# Patient Record
Sex: FEMALE | Race: WHITE | HISPANIC OR LATINO | URBAN - METROPOLITAN AREA
[De-identification: names, ages, dates, MRNs, and addresses within clinical notes are randomized per-mention and may not be internally consistent; named-entity substitution may affect disease eponyms.]

---

## 2022-09-01 ENCOUNTER — INPATIENT (INPATIENT)
Facility: HOSPITAL | Age: 32
LOS: 0 days | Discharge: HOME | End: 2022-09-02
Attending: PSYCHIATRY & NEUROLOGY | Admitting: PSYCHIATRY & NEUROLOGY

## 2022-09-01 VITALS
HEART RATE: 89 BPM | RESPIRATION RATE: 18 BRPM | SYSTOLIC BLOOD PRESSURE: 112 MMHG | DIASTOLIC BLOOD PRESSURE: 61 MMHG | OXYGEN SATURATION: 100 % | TEMPERATURE: 99 F

## 2022-09-01 DIAGNOSIS — F43.10 POST-TRAUMATIC STRESS DISORDER, UNSPECIFIED: ICD-10-CM

## 2022-09-01 DIAGNOSIS — F41.1 GENERALIZED ANXIETY DISORDER: ICD-10-CM

## 2022-09-01 DIAGNOSIS — F32.2 MAJOR DEPRESSIVE DISORDER, SINGLE EPISODE, SEVERE WITHOUT PSYCHOTIC FEATURES: ICD-10-CM

## 2022-09-01 LAB
ALBUMIN SERPL ELPH-MCNC: 4.4 G/DL — SIGNIFICANT CHANGE UP (ref 3.5–5.2)
ALP SERPL-CCNC: 90 U/L — SIGNIFICANT CHANGE UP (ref 30–115)
ALT FLD-CCNC: 9 U/L — SIGNIFICANT CHANGE UP (ref 0–41)
AMPHET UR-MCNC: NEGATIVE — SIGNIFICANT CHANGE UP
ANION GAP SERPL CALC-SCNC: 9 MMOL/L — SIGNIFICANT CHANGE UP (ref 7–14)
APAP SERPL-MCNC: <5 UG/ML — LOW (ref 10–30)
APPEARANCE UR: CLEAR — SIGNIFICANT CHANGE UP
APPEARANCE UR: CLEAR — SIGNIFICANT CHANGE UP
AST SERPL-CCNC: 16 U/L — SIGNIFICANT CHANGE UP (ref 0–41)
BACTERIA # UR AUTO: ABNORMAL
BARBITURATES UR SCN-MCNC: NEGATIVE — SIGNIFICANT CHANGE UP
BASE EXCESS BLDV CALC-SCNC: 0.8 MMOL/L — SIGNIFICANT CHANGE UP (ref -2–3)
BASOPHILS # BLD AUTO: 0.02 K/UL — SIGNIFICANT CHANGE UP (ref 0–0.2)
BASOPHILS NFR BLD AUTO: 0.4 % — SIGNIFICANT CHANGE UP (ref 0–1)
BENZODIAZ UR-MCNC: POSITIVE
BILIRUB SERPL-MCNC: 0.3 MG/DL — SIGNIFICANT CHANGE UP (ref 0.2–1.2)
BILIRUB UR-MCNC: NEGATIVE — SIGNIFICANT CHANGE UP
BILIRUB UR-MCNC: NEGATIVE — SIGNIFICANT CHANGE UP
BUN SERPL-MCNC: 14 MG/DL — SIGNIFICANT CHANGE UP (ref 10–20)
CA-I SERPL-SCNC: 1.28 MMOL/L — SIGNIFICANT CHANGE UP (ref 1.15–1.33)
CALCIUM SERPL-MCNC: 9.1 MG/DL — SIGNIFICANT CHANGE UP (ref 8.5–10.1)
CHLORIDE SERPL-SCNC: 106 MMOL/L — SIGNIFICANT CHANGE UP (ref 98–110)
CO2 SERPL-SCNC: 24 MMOL/L — SIGNIFICANT CHANGE UP (ref 17–32)
COCAINE METAB.OTHER UR-MCNC: POSITIVE
COLOR SPEC: YELLOW — SIGNIFICANT CHANGE UP
COLOR SPEC: YELLOW — SIGNIFICANT CHANGE UP
CREAT SERPL-MCNC: 0.9 MG/DL — SIGNIFICANT CHANGE UP (ref 0.7–1.5)
DIFF PNL FLD: NEGATIVE — SIGNIFICANT CHANGE UP
DIFF PNL FLD: NEGATIVE — SIGNIFICANT CHANGE UP
EGFR: 88 ML/MIN/1.73M2 — SIGNIFICANT CHANGE UP
EOSINOPHIL # BLD AUTO: 0.17 K/UL — SIGNIFICANT CHANGE UP (ref 0–0.7)
EOSINOPHIL NFR BLD AUTO: 3.7 % — SIGNIFICANT CHANGE UP (ref 0–8)
EPI CELLS # UR: 10 /HPF — HIGH (ref 0–5)
ETHANOL SERPL-MCNC: <10 MG/DL — SIGNIFICANT CHANGE UP
GAS PNL BLDV: 138 MMOL/L — SIGNIFICANT CHANGE UP (ref 136–145)
GAS PNL BLDV: SIGNIFICANT CHANGE UP
GLUCOSE SERPL-MCNC: 86 MG/DL — SIGNIFICANT CHANGE UP (ref 70–99)
GLUCOSE UR QL: NEGATIVE MG/DL — SIGNIFICANT CHANGE UP
GLUCOSE UR QL: NEGATIVE — SIGNIFICANT CHANGE UP
HCG SERPL QL: NEGATIVE — SIGNIFICANT CHANGE UP
HCG UR QL: NEGATIVE — SIGNIFICANT CHANGE UP
HCO3 BLDV-SCNC: 27 MMOL/L — SIGNIFICANT CHANGE UP (ref 22–29)
HCT VFR BLD CALC: 38.7 % — SIGNIFICANT CHANGE UP (ref 37–47)
HCT VFR BLDA CALC: 42 % — SIGNIFICANT CHANGE UP (ref 39–51)
HGB BLD CALC-MCNC: 14.1 G/DL — SIGNIFICANT CHANGE UP (ref 12.6–17.4)
HGB BLD-MCNC: 13.4 G/DL — SIGNIFICANT CHANGE UP (ref 12–16)
HYALINE CASTS # UR AUTO: 11 /LPF — HIGH (ref 0–7)
IMM GRANULOCYTES NFR BLD AUTO: 0.2 % — SIGNIFICANT CHANGE UP (ref 0.1–0.3)
KETONES UR-MCNC: NEGATIVE — SIGNIFICANT CHANGE UP
KETONES UR-MCNC: SIGNIFICANT CHANGE UP
LACTATE BLDV-MCNC: 0.7 MMOL/L — SIGNIFICANT CHANGE UP (ref 0.5–2)
LEUKOCYTE ESTERASE UR-ACNC: ABNORMAL
LEUKOCYTE ESTERASE UR-ACNC: NEGATIVE — SIGNIFICANT CHANGE UP
LYMPHOCYTES # BLD AUTO: 2.19 K/UL — SIGNIFICANT CHANGE UP (ref 1.2–3.4)
LYMPHOCYTES # BLD AUTO: 47.8 % — SIGNIFICANT CHANGE UP (ref 20.5–51.1)
MCHC RBC-ENTMCNC: 29.5 PG — SIGNIFICANT CHANGE UP (ref 27–31)
MCHC RBC-ENTMCNC: 34.6 G/DL — SIGNIFICANT CHANGE UP (ref 32–37)
MCV RBC AUTO: 85.2 FL — SIGNIFICANT CHANGE UP (ref 81–99)
METHADONE UR-MCNC: NEGATIVE — SIGNIFICANT CHANGE UP
MONOCYTES # BLD AUTO: 0.34 K/UL — SIGNIFICANT CHANGE UP (ref 0.1–0.6)
MONOCYTES NFR BLD AUTO: 7.4 % — SIGNIFICANT CHANGE UP (ref 1.7–9.3)
NEUTROPHILS # BLD AUTO: 1.85 K/UL — SIGNIFICANT CHANGE UP (ref 1.4–6.5)
NEUTROPHILS NFR BLD AUTO: 40.5 % — LOW (ref 42.2–75.2)
NITRITE UR-MCNC: NEGATIVE — SIGNIFICANT CHANGE UP
NITRITE UR-MCNC: NEGATIVE — SIGNIFICANT CHANGE UP
NRBC # BLD: 0 /100 WBCS — SIGNIFICANT CHANGE UP (ref 0–0)
OPIATES UR-MCNC: NEGATIVE — SIGNIFICANT CHANGE UP
PCO2 BLDV: 46 MMHG — HIGH (ref 39–42)
PCP SPEC-MCNC: SIGNIFICANT CHANGE UP
PH BLDV: 7.37 — SIGNIFICANT CHANGE UP (ref 7.32–7.43)
PH UR: 6 — SIGNIFICANT CHANGE UP (ref 5–8)
PH UR: 7.5 — SIGNIFICANT CHANGE UP (ref 5–8)
PLATELET # BLD AUTO: 270 K/UL — SIGNIFICANT CHANGE UP (ref 130–400)
PO2 BLDV: 37 MMHG — SIGNIFICANT CHANGE UP
POTASSIUM BLDV-SCNC: 3.8 MMOL/L — SIGNIFICANT CHANGE UP (ref 3.5–5.1)
POTASSIUM SERPL-MCNC: 4.2 MMOL/L — SIGNIFICANT CHANGE UP (ref 3.5–5)
POTASSIUM SERPL-SCNC: 4.2 MMOL/L — SIGNIFICANT CHANGE UP (ref 3.5–5)
PROPOXYPHENE QUALITATIVE URINE RESULT: NEGATIVE — SIGNIFICANT CHANGE UP
PROT SERPL-MCNC: 6.7 G/DL — SIGNIFICANT CHANGE UP (ref 6–8)
PROT UR-MCNC: NEGATIVE MG/DL — SIGNIFICANT CHANGE UP
PROT UR-MCNC: SIGNIFICANT CHANGE UP
RBC # BLD: 4.54 M/UL — SIGNIFICANT CHANGE UP (ref 4.2–5.4)
RBC # FLD: 12.9 % — SIGNIFICANT CHANGE UP (ref 11.5–14.5)
RBC CASTS # UR COMP ASSIST: 4 /HPF — SIGNIFICANT CHANGE UP (ref 0–4)
SALICYLATES SERPL-MCNC: <0.3 MG/DL — LOW (ref 4–30)
SAO2 % BLDV: 61.6 % — SIGNIFICANT CHANGE UP
SARS-COV-2 RNA SPEC QL NAA+PROBE: SIGNIFICANT CHANGE UP
SODIUM SERPL-SCNC: 139 MMOL/L — SIGNIFICANT CHANGE UP (ref 135–146)
SP GR SPEC: 1.02 — SIGNIFICANT CHANGE UP (ref 1.01–1.03)
SP GR SPEC: 1.03 — SIGNIFICANT CHANGE UP (ref 1.01–1.03)
TROPONIN T SERPL-MCNC: <0.01 NG/ML — SIGNIFICANT CHANGE UP
UROBILINOGEN FLD QL: 0.2 MG/DL — SIGNIFICANT CHANGE UP
UROBILINOGEN FLD QL: SIGNIFICANT CHANGE UP
WBC # BLD: 4.58 K/UL — LOW (ref 4.8–10.8)
WBC # FLD AUTO: 4.58 K/UL — LOW (ref 4.8–10.8)
WBC UR QL: 5 /HPF — SIGNIFICANT CHANGE UP (ref 0–5)

## 2022-09-01 PROCEDURE — 90792 PSYCH DIAG EVAL W/MED SRVCS: CPT | Mod: 95

## 2022-09-01 PROCEDURE — 93010 ELECTROCARDIOGRAM REPORT: CPT

## 2022-09-01 PROCEDURE — 93010 ELECTROCARDIOGRAM REPORT: CPT | Mod: 77

## 2022-09-01 PROCEDURE — 99215 OFFICE O/P EST HI 40 MIN: CPT

## 2022-09-01 PROCEDURE — 99284 EMERGENCY DEPT VISIT MOD MDM: CPT

## 2022-09-01 PROCEDURE — 99232 SBSQ HOSP IP/OBS MODERATE 35: CPT

## 2022-09-01 RX ORDER — DIPHENHYDRAMINE HCL 50 MG
50 CAPSULE ORAL EVERY 6 HOURS
Refills: 0 | Status: DISCONTINUED | OUTPATIENT
Start: 2022-09-01 | End: 2022-09-02

## 2022-09-01 RX ORDER — SERTRALINE 25 MG/1
50 TABLET, FILM COATED ORAL DAILY
Refills: 0 | Status: DISCONTINUED | OUTPATIENT
Start: 2022-09-01 | End: 2022-09-02

## 2022-09-01 RX ORDER — SERTRALINE 25 MG/1
50 TABLET, FILM COATED ORAL DAILY
Refills: 0 | Status: DISCONTINUED | OUTPATIENT
Start: 2022-09-01 | End: 2022-09-01

## 2022-09-01 RX ORDER — HALOPERIDOL DECANOATE 100 MG/ML
5 INJECTION INTRAMUSCULAR EVERY 8 HOURS
Refills: 0 | Status: DISCONTINUED | OUTPATIENT
Start: 2022-09-01 | End: 2022-09-01

## 2022-09-01 RX ORDER — HALOPERIDOL DECANOATE 100 MG/ML
5 INJECTION INTRAMUSCULAR EVERY 6 HOURS
Refills: 0 | Status: DISCONTINUED | OUTPATIENT
Start: 2022-09-01 | End: 2022-09-02

## 2022-09-01 RX ORDER — HYDROXYZINE HCL 10 MG
50 TABLET ORAL EVERY 6 HOURS
Refills: 0 | Status: DISCONTINUED | OUTPATIENT
Start: 2022-09-01 | End: 2022-09-02

## 2022-09-01 RX ORDER — DIPHENHYDRAMINE HCL 50 MG
50 CAPSULE ORAL EVERY 8 HOURS
Refills: 0 | Status: DISCONTINUED | OUTPATIENT
Start: 2022-09-01 | End: 2022-09-01

## 2022-09-01 RX ORDER — SODIUM CHLORIDE 9 MG/ML
1000 INJECTION, SOLUTION INTRAVENOUS ONCE
Refills: 0 | Status: DISCONTINUED | OUTPATIENT
Start: 2022-09-01 | End: 2022-09-01

## 2022-09-01 RX ADMIN — SERTRALINE 50 MILLIGRAM(S): 25 TABLET, FILM COATED ORAL at 15:34

## 2022-09-01 NOTE — ED ADULT NURSE NOTE - NSIMPLEMENTINTERV_GEN_ALL_ED
Implemented All Fall with Harm Risk Interventions:  [place] to call system. Call bell, personal items and telephone within reach. Instruct patient to call for assistance. Room bathroom lighting operational. Non-slip footwear when patient is off stretcher. Physically safe environment: no spills, clutter or unnecessary equipment. Stretcher in lowest position, wheels locked, appropriate side rails in place. Provide visual cue, wrist band, yellow gown, etc. Monitor gait and stability. Monitor for mental status changes and reorient to person, place, and time. Review medications for side effects contributing to fall risk. Reinforce activity limits and safety measures with patient and family. Provide visual clues: red socks.
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
## 2022-09-01 NOTE — ED BEHAVIORAL HEALTH ASSESSMENT NOTE - OTHER PAST PSYCHIATRIC HISTORY (INCLUDE DETAILS REGARDING ONSET, COURSE OF ILLNESS, INPATIENT/OUTPATIENT TREATMENT)
as per HPI  pt denied any formal psychiatric encounters beyond brief medical encounter subsequent to overdosing at 12/13 years old. Psyckes indicates history of MDD, unspecified anxiety and panic disorder with one ER visit for panic sx 10/2019.

## 2022-09-01 NOTE — ED ADULT TRIAGE NOTE - CHIEF COMPLAINT QUOTE
pts beena father states that pt took 8 blue xanax x1 hour ago, states it was an attempt to hurt herself, 1:1 initiated in triage

## 2022-09-01 NOTE — H&P ADULT - NSHPLABSRESULTS_GEN_ALL_CORE
13.4   4.58  )-----------( 270      ( 01 Sep 2022 02:09 )             38.7           139  |  106  |  14  ----------------------------<  86  4.2   |  24  |  0.9    Ca    9.1      01 Sep 2022 02:09    TPro  6.7  /  Alb  4.4  /  TBili  0.3  /  DBili  x   /  AST  16  /  ALT  9   /  AlkPhos  90          Urinalysis Basic - ( 01 Sep 2022 11:51 )    Color: Yellow / Appearance: Clear / S.029 / pH: x  Gluc: x / Ketone: Trace  / Bili: Negative / Urobili: <2 mg/dL   Blood: x / Protein: Trace / Nitrite: Negative   Leuk Esterase: Small / RBC: 4 /HPF / WBC 5 /HPF   Sq Epi: x / Non Sq Epi: 10 /HPF / Bacteria: Many    CARDIAC MARKERS ( 01 Sep 2022 02:09 )  x     / <0.01 ng/mL / x     / x     / x      Serum Pregnancy: Negative (22 @ 10:50)

## 2022-09-01 NOTE — BH CONSULTATION LIAISON PROGRESS NOTE - NSBHCHARTREVIEWINVESTIGATE_PSY_A_CORE FT
< from: 12 Lead ECG (09.01.22 @ 03:11) >      Ventricular Rate 80 BPM    Atrial Rate 80 BPM    P-R Interval 108 ms    QRS Duration 86 ms    Q-T Interval 364 ms    QTC Calculation(Bazett) 419 ms    P Axis 77 degrees    R Axis 87 degrees    T Axis 62 degrees    Diagnosis Line Sinus rhythm with short AL  Otherwise normal ECG    Confirmed by JOLENE BURGER MD ([id number]) on 9/1/2022 6:05:47 AM    < end of copied text >

## 2022-09-01 NOTE — BH CONSULTATION LIAISON PROGRESS NOTE - NSBHCHARTREVIEWLAB_PSY_A_CORE FT
13.4   4.58  )-----------( 270      ( 01 Sep 2022 02:09 )             38.7   09-01    139  |  106  |  14  ----------------------------<  86  4.2   |  24  |  0.9    Ca    9.1      01 Sep 2022 02:09    TPro  6.7  /  Alb  4.4  /  TBili  0.3  /  DBili  x   /  AST  16  /  ALT  9   /  AlkPhos  90  09-01

## 2022-09-01 NOTE — PROGRESS NOTE BEHAVIORAL HEALTH - NSBHADDHXSUBSTFT_PSY_A_CORE
Denies any ETOH illicit drug use besides the Xanax she started using occasionally within the last 2 weeks

## 2022-09-01 NOTE — H&P ADULT - NSHPPHYSICALEXAM_GEN_ALL_CORE
Vital Signs Last 24 Hrs  T(C): 37 (01 Sep 2022 12:38), Max: 37 (01 Sep 2022 01:41)  T(F): 98.6 (01 Sep 2022 12:38), Max: 98.6 (01 Sep 2022 01:41)  HR: 86 (01 Sep 2022 12:38) (76 - 89)  BP: 110/68 (01 Sep 2022 12:38) (92/54 - 112/61)  BP(mean): --  RR: 18 (01 Sep 2022 12:38) (16 - 18)  SpO2: 100% (01 Sep 2022 10:19) (100% - 100%)    Parameters below as of 01 Sep 2022 10:19  Patient On (Oxygen Delivery Method): room air      PHYSICAL EXAM:  Constitutional: NAD, A&O x3  Eyes: PERRLA, no conjunctivitis  Neck: no lymphadenopathy  Respiratory: +air entry, no rales, no rhonchi, no wheezes  Cardiovascular: +S1 and S2, regular rate and rhythm  Gastrointestinal: +BS, soft, non-tender, not distended  Extremities:  no edema, no calf tenderness  Skin: no rashes, normal turgor

## 2022-09-01 NOTE — CONSULT NOTE ADULT - CONSULT REQUESTED BY NAME
Pt ambulatory to triage without complications. Pt states cough since Thursday with white sputum. Pt denies fever, n/v/d. Pt reports bodyaches and intermittent chills. Pt states sometimes SOB when lying flat or on exertion, denies cp. Swelling to RLE. +3 Pitting. Pt reports hx of CHF but denies taking diuretic. Pt did get flu shot, not sure if around anyone else that has been ill. Attempted IV x 1. [place] collected.  Line not successful ED Attending
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
## 2022-09-01 NOTE — ED BEHAVIORAL HEALTH NOTE - BEHAVIORAL HEALTH NOTE
===================     PRE-HOSPITAL COURSE     ===================     SOURCE: RN and secondhand ED documentation      DETAILS: Per RN, pt self-presented to the ED accompanied by her partner.      ============     ED COURSE     ============     SOURCE: RN and secondhand ED documentation      ARRIVAL: Pt self-presented      BELONGINGS: Any/all belongings were provided to hospital security and pt is currently in a gown with a 1:1 staff member.       BEHAVIOR: Per RN, pt presented to the ED after taking 10 Xanax in attempt to harm self. Per RN, pt presents “fine”, complaint and is currently sleeping. Per RN, pt is slightly disconnected, denies active SI/HI and fair ADL’s/.     TREATMENT: None required      VISITORS: PT’s partner is at bedside         ------------------------------------------------     COVID Exposure Screen- collateral (i.e. third-party, chart review, belongings, etc; include EMS and ED staff)      ---------------------------------------------------     1.    Has the patient had a COVID-19 test in the last 90 days? Unknown.      2. Has the patient tested positive for COVID-19 antibodies? Unknown.      3.Has the patient received 2 doses of the COVID-19 vaccine?  Unknown.      4. In the past 10 days, has the patient been around anyone with a positive COVID-19 test?* Unknown.      5.Has the patient been out of [place] within the past 10 days? Unknown.

## 2022-09-01 NOTE — ED PROVIDER NOTE - PHYSICAL EXAMINATION
CONSTITUTIONAL:  in no acute distress.   SKIN: warm, dry  HEAD: Normocephalic; atraumatic.  EYES: PERRL, EOMI, normal sclera and conjunctiva   ENT: No nasal discharge; airway clear.  NECK: Supple; non tender.  CARD:  Regular rate and rhythm.   RESP: NO inc WOB   ABD: soft ntnd  EXT: Normal ROM.    LYMPH: No acute cervical adenopathy.  NEURO: Alert, oriented, grossly unremarkable  PSYCH: Cooperative, anxious
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
## 2022-09-01 NOTE — ED BEHAVIORAL HEALTH ASSESSMENT NOTE - RISK ASSESSMENT
Pertinent known risk and protective factors are noted in documentation above Moderate Acute Suicide Risk

## 2022-09-01 NOTE — H&P ADULT - HISTORY OF PRESENT ILLNESS
Patient is a 31 year old female with hx of anxiety, depression, panic disorder admitted to [facility] for suicide attempt by taking several tablets of Xanax. Patient is not a chronic benzo user. Patient denies tobacco/alcohol/illicit drug use. Patient has prior hx of suicide attempt when she was a teenager. Currently has no complaints.

## 2022-09-01 NOTE — PROGRESS NOTE BEHAVIORAL HEALTH - OTHER
grossly intact but somnolent in appearance expressive of depression/anxiety symptomatology and desire for treatment low cut hair dyed red/orange somnolent at onset of evaluation; slow to awaken with slow slurred speech throughout evaluation but generally well-mannered and interactive. fair with somnolent blinking unable to assess somnolent

## 2022-09-01 NOTE — BH CONSULTATION LIAISON PROGRESS NOTE - GENERAL APPEARANCE
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
## 2022-09-01 NOTE — CHART NOTE - NSCHARTNOTEFT_GEN_A_CORE
Social Work Note:    Patient is a 31 years of age female who was admitted for evaluation following a suicide attempt.  Patient was brought to the hospital by her boyfriend after she intentionally overdosed on 8-10 blue Xanax bars.  At the time of assessment in the emergency department, patient informed that she took the pills because she was “frustrated” due to "personal problems in my life that I'm going through".  She said she was “kind of" trying to kill herself because “sometimes I just don't want to live." She endorsed feeling depressed of late with symptoms such as  irritability, low energy, erratic sleep, poor concentration, and intermittent suicidal thoughts.  She was admitted to [facility] for safety and stabilization.      In the community, patient is domiciled in a private residence on [place] with her family.  She is domestically partnered and has a 10-year-old son.  Patient is self employed as a .  She has a past psychiatric history of major depressive disorder, unspecified anxiety, and panic disorder.  She has one known ED visit related to panic symptoms with subsequent trial of Hydroxyzine (10/2019).  She has no prior inpatient psychiatric admissions.  Patient reports one prior suicide attempt at age [age].  She is not currently engaged in outpatient mental health treatment.        Sexual History – Patient identifies as heterosexual.  She is in a domestic partnership.     Family relationships and history – Patient resides with her brother, her boyfriend, and her 10-year-old son.  Patient reports having a strained relationship with her boyfriend and limited social supports.      Leisure Activity Assessment – Unable to assess    Community Supports –  None known     Employment – Patient is self employed as a .       Substance Use Assessment – Patient denies    History of suicidality or self- injurious behaviors – Patient reports that she attempted suicide at age [age] by OD’ing on a family members antidepressants.       Significant Loses – None identified.         Life Goals – Patient verbalized goal of improved mental health         will continue to meet with patient 1:1 and with treatment team daily.  Discharge plan is for continued mental health treatment in an outpatient setting.
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
## 2022-09-01 NOTE — ED BEHAVIORAL HEALTH ASSESSMENT NOTE - OTHER
low cut hair dyed red/orange self-employed  somnolent [employer] ([address] [facility]) boyfriend/child's father grossly intact but somnolent in appearance unable to assess expressive of depression/anxiety symptomatology and desire for treatment Records Checked-With Data: psycemir. Records Checked-No Data: [facility], [facility], [facility], [facility], [facility], [facility], [facility], [facility], [facility], [facility], [facility], [facility], [facility], [facility], [facility], [facility], [facility], Social Media (For example - Facebook, Tactilize, YouFetch), Web search, [employer]. boyfriend somnolent at onset of evaluation; slow to awaken with slow slurred speech throughout evaluation but generally well-mannered and interactive. fair with somnolent blinking
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
## 2022-09-01 NOTE — ED PROVIDER NOTE - PROGRESS NOTE DETAILS
WILL < 10, telepsych contacted- SD William: Psych consulted, will come and see. Patient signed out to me by Dr. Massey.  Patient evaluated by psychiatry.  Patient to be admitted to [facility].

## 2022-09-01 NOTE — H&P ADULT - ASSESSMENT
Patient is a 31 year old female with hx of anxiety, depression, panic disorder admitted to [facility] for suicide attempt by taking several tablets of Xanax. Patient is not a chronic benzo user. Patient denies tobacco/alcohol/illicit drug use. Patient has prior hx of suicide attempt when she was a teenager. Currently has no complaints.    # suicide attempt  - There is very low concern for benzo withdrawal at this point. Monitor for benzo wd signs.   - Management per psychiatry  - EKG for qtc monitoring  - ambulate  - reg diet

## 2022-09-01 NOTE — BH CONSULTATION LIAISON PROGRESS NOTE - NSBHCHARTREVIEWVS_PSY_A_CORE FT
Vital Signs Last 24 Hrs  T(C): 36.1 (01 Sep 2022 10:19), Max: 37 (01 Sep 2022 01:41)  T(F): 97 (01 Sep 2022 10:19), Max: 98.6 (01 Sep 2022 01:41)  HR: 88 (01 Sep 2022 10:19) (76 - 89)  BP: 105/56 (01 Sep 2022 10:19) (92/54 - 112/61)  BP(mean): --  RR: 16 (01 Sep 2022 10:19) (16 - 18)  SpO2: 100% (01 Sep 2022 10:19) (100% - 100%)    Parameters below as of 01 Sep 2022 10:19  Patient On (Oxygen Delivery Method): room air

## 2022-09-01 NOTE — ED BEHAVIORAL HEALTH ASSESSMENT NOTE - PAST PSYCHOTROPIC MEDICATION
per psandrae; has previously been on Hydroxyzine 25 mg 3/day for ~2 weeks in 2019 corresponding with an ER visit

## 2022-09-01 NOTE — CONSULT NOTE ADULT - ASSESSMENT
·	Patient is in no acute Sedative/hypnotic toxidrome.   ·	Labs test results, incl. serum tox screen are within normal limits.  ·	No extended period of observation is necessary.  ·	She can be cleared from a toxicology standpoint.     Thank you for the consult.  ·	Patient is in no acute Sedative/hypnotic toxidrome.   ·	Labs test results, incl. serum tox screen are within normal limits.  ·	No extended period of observation is necessary.  ·	She can be cleared from a toxicology standpoint.     Thank you for the consult.     I personally discussed with ED team. I reviewed the med tox fellow’s note (as assigned above), and agree with the findings and plan except as documented in my note.    Alprazolam intentional ingestion without any evidence of edative hypnotic toxidrome.  Normal labs. vital signs and EKG.  Medically stable for psych eval and dispo and cleared from med tox perspective;    --Please call with any further questions    bOi    [phone] (pager)al and dispo and cleared from med tox standpoint.    .obi

## 2022-09-01 NOTE — ED BEHAVIORAL HEALTH ASSESSMENT NOTE - DIFFERENTIAL
major depressive disorder vs other mood disorder  generalized anxiety disorder w/ panic attacks  r/o adjustment disorder w/ mixed anxiety & depressed mood  r/o complex PTSD and borderline personality disorder

## 2022-09-01 NOTE — CONSULT NOTE ADULT - SUBJECTIVE AND OBJECTIVE BOX
MEDICAL TOXICOLOGY CONSULT    HPI: 31 Yr female PMHx Depression with intentional OD on 10 x Xanax 5 hours PTA. Patient is asymptomatic. Psyche requesting clearance by Toxicology      ONSET / TIME of exposure(s): 5 hours PTA    QUANTITY of exposure(s):10 x Xanax, exact mg/pill unknown    ROUTE of exposure:  Ingestion     CONTEXT of exposure: Home    ASSOCIATED symptoms: None    REVIEW OF SYSTEMS:    Negative except HPI above    Vital Signs Last 24 Hrs  T(C): 37 (01 Sep 2022 01:41), Max: 37 (01 Sep 2022 01:41)  T(F): 98.6 (01 Sep 2022 01:41), Max: 98.6 (01 Sep 2022 01:41)  HR: 89 (01 Sep 2022 01:41) (89 - 89)  BP: 112/61 (01 Sep 2022 01:41) (112/61 - 112/61)  RR: 18 (01 Sep 2022 01:41) (18 - 18)  SpO2: 100% (01 Sep 2022 01:41) (100% - 100%)    Parameters below as of 01 Sep 2022 01:41  Patient On (Oxygen Delivery Method): room air    SIGNIFICANT LABORATORY STUDIES:                        13.4   4.58  )-----------( 270      ( 01 Sep 2022 02:09 )             38.7     09-01    139  |  106  |  14  ----------------------------<  86  4.2   |  24  |  0.9    Ca    9.1      01 Sep 2022 02:09    TPro  6.7  /  Alb  4.4  /  TBili  0.3  /  DBili  x   /  AST  16  /  ALT  9   /  AlkPhos  90  09-01    Anion Gap: 9 09-01 @ 02:09  Osmolality Serum:  --  09-01 @ 02:09  Aspirin Level: <0.3<L>  09-01 @ 02:09  Acetaminophen Level:  <5.0<L>  09-01 @ 02:09      
No

## 2022-09-02 VITALS
RESPIRATION RATE: 16 BRPM | SYSTOLIC BLOOD PRESSURE: 110 MMHG | HEART RATE: 82 BPM | DIASTOLIC BLOOD PRESSURE: 63 MMHG | TEMPERATURE: 98 F

## 2022-09-02 DIAGNOSIS — T42.4X2A POISONING BY BENZODIAZEPINES, INTENTIONAL SELF-HARM, INITIAL ENCOUNTER: ICD-10-CM

## 2022-09-02 DIAGNOSIS — F34.1 DYSTHYMIC DISORDER: ICD-10-CM

## 2022-09-02 PROBLEM — F41.9 ANXIETY DISORDER, UNSPECIFIED: Chronic | Status: ACTIVE | Noted: 2022-09-01

## 2022-09-02 PROBLEM — F41.0 PANIC DISORDER [EPISODIC PAROXYSMAL ANXIETY]: Chronic | Status: ACTIVE | Noted: 2022-09-01

## 2022-09-02 PROBLEM — F32.9 MAJOR DEPRESSIVE DISORDER, SINGLE EPISODE, UNSPECIFIED: Chronic | Status: ACTIVE | Noted: 2022-09-01

## 2022-09-02 LAB
A1C WITH ESTIMATED AVERAGE GLUCOSE RESULT: 5.2 % — SIGNIFICANT CHANGE UP (ref 4–5.6)
ALBUMIN SERPL ELPH-MCNC: 4.5 G/DL — SIGNIFICANT CHANGE UP (ref 3.5–5.2)
ALP SERPL-CCNC: 86 U/L — SIGNIFICANT CHANGE UP (ref 30–115)
ALT FLD-CCNC: 8 U/L — SIGNIFICANT CHANGE UP (ref 0–41)
AMPHET UR-MCNC: SIGNIFICANT CHANGE UP
ANION GAP SERPL CALC-SCNC: 12 MMOL/L — SIGNIFICANT CHANGE UP (ref 7–14)
AST SERPL-CCNC: 16 U/L — SIGNIFICANT CHANGE UP (ref 0–41)
BARBITURATES UR SCN-MCNC: SIGNIFICANT CHANGE UP
BASOPHILS # BLD AUTO: 0.03 K/UL — SIGNIFICANT CHANGE UP (ref 0–0.2)
BASOPHILS NFR BLD AUTO: 0.7 % — SIGNIFICANT CHANGE UP (ref 0–1)
BENZODIAZ UR-MCNC: SIGNIFICANT CHANGE UP
BILIRUB SERPL-MCNC: 0.6 MG/DL — SIGNIFICANT CHANGE UP (ref 0.2–1.2)
BUN SERPL-MCNC: 14 MG/DL — SIGNIFICANT CHANGE UP (ref 10–20)
CALCIUM SERPL-MCNC: 9 MG/DL — SIGNIFICANT CHANGE UP (ref 8.5–10.1)
CHLORIDE SERPL-SCNC: 103 MMOL/L — SIGNIFICANT CHANGE UP (ref 98–110)
CHOLEST SERPL-MCNC: 200 MG/DL — HIGH
CO2 SERPL-SCNC: 23 MMOL/L — SIGNIFICANT CHANGE UP (ref 17–32)
COCAINE METAB.OTHER UR-MCNC: SIGNIFICANT CHANGE UP
CREAT SERPL-MCNC: 0.9 MG/DL — SIGNIFICANT CHANGE UP (ref 0.7–1.5)
DRUG SCREEN 1, URINE RESULT: SIGNIFICANT CHANGE UP
EGFR: 88 ML/MIN/1.73M2 — SIGNIFICANT CHANGE UP
EOSINOPHIL # BLD AUTO: 0.11 K/UL — SIGNIFICANT CHANGE UP (ref 0–0.7)
EOSINOPHIL NFR BLD AUTO: 2.6 % — SIGNIFICANT CHANGE UP (ref 0–8)
ESTIMATED AVERAGE GLUCOSE: 103 MG/DL — SIGNIFICANT CHANGE UP (ref 68–114)
FENTANYL UR QL: SIGNIFICANT CHANGE UP
GLUCOSE SERPL-MCNC: 98 MG/DL — SIGNIFICANT CHANGE UP (ref 70–99)
HCT VFR BLD CALC: 41.4 % — SIGNIFICANT CHANGE UP (ref 37–47)
HDLC SERPL-MCNC: 52 MG/DL — SIGNIFICANT CHANGE UP
HGB BLD-MCNC: 13.9 G/DL — SIGNIFICANT CHANGE UP (ref 12–16)
IMM GRANULOCYTES NFR BLD AUTO: 0.5 % — HIGH (ref 0.1–0.3)
LIPID PNL WITH DIRECT LDL SERPL: 134 MG/DL — HIGH
LYMPHOCYTES # BLD AUTO: 2.01 K/UL — SIGNIFICANT CHANGE UP (ref 1.2–3.4)
LYMPHOCYTES # BLD AUTO: 47.6 % — SIGNIFICANT CHANGE UP (ref 20.5–51.1)
MCHC RBC-ENTMCNC: 28.6 PG — SIGNIFICANT CHANGE UP (ref 27–31)
MCHC RBC-ENTMCNC: 33.6 G/DL — SIGNIFICANT CHANGE UP (ref 32–37)
MCV RBC AUTO: 85.2 FL — SIGNIFICANT CHANGE UP (ref 81–99)
METHADONE UR-MCNC: SIGNIFICANT CHANGE UP
MONOCYTES # BLD AUTO: 0.27 K/UL — SIGNIFICANT CHANGE UP (ref 0.1–0.6)
MONOCYTES NFR BLD AUTO: 6.4 % — SIGNIFICANT CHANGE UP (ref 1.7–9.3)
NEUTROPHILS # BLD AUTO: 1.78 K/UL — SIGNIFICANT CHANGE UP (ref 1.4–6.5)
NEUTROPHILS NFR BLD AUTO: 42.2 % — SIGNIFICANT CHANGE UP (ref 42.2–75.2)
NON HDL CHOLESTEROL: 148 MG/DL — HIGH
NRBC # BLD: 0 /100 WBCS — SIGNIFICANT CHANGE UP (ref 0–0)
OPIATES UR-MCNC: SIGNIFICANT CHANGE UP
OXYCODONE UR-MCNC: SIGNIFICANT CHANGE UP
PCP UR-MCNC: SIGNIFICANT CHANGE UP
PLATELET # BLD AUTO: 303 K/UL — SIGNIFICANT CHANGE UP (ref 130–400)
POTASSIUM SERPL-MCNC: 4.2 MMOL/L — SIGNIFICANT CHANGE UP (ref 3.5–5)
POTASSIUM SERPL-SCNC: 4.2 MMOL/L — SIGNIFICANT CHANGE UP (ref 3.5–5)
PROPOXYPHENE QUALITATIVE URINE RESULT: SIGNIFICANT CHANGE UP
PROT SERPL-MCNC: 7 G/DL — SIGNIFICANT CHANGE UP (ref 6–8)
RBC # BLD: 4.86 M/UL — SIGNIFICANT CHANGE UP (ref 4.2–5.4)
RBC # FLD: 12.5 % — SIGNIFICANT CHANGE UP (ref 11.5–14.5)
SODIUM SERPL-SCNC: 138 MMOL/L — SIGNIFICANT CHANGE UP (ref 135–146)
THC UR QL: SIGNIFICANT CHANGE UP
TRIGL SERPL-MCNC: 72 MG/DL — SIGNIFICANT CHANGE UP
TSH SERPL-MCNC: 0.44 UIU/ML — SIGNIFICANT CHANGE UP (ref 0.27–4.2)
WBC # BLD: 4.22 K/UL — LOW (ref 4.8–10.8)
WBC # FLD AUTO: 4.22 K/UL — LOW (ref 4.8–10.8)

## 2022-09-02 PROCEDURE — 99238 HOSP IP/OBS DSCHRG MGMT 30/<: CPT

## 2022-09-02 PROCEDURE — ZZZZZ: CPT

## 2022-09-02 RX ORDER — SERTRALINE 25 MG/1
1 TABLET, FILM COATED ORAL
Qty: 25 | Refills: 0
Start: 2022-09-02 | End: 2022-09-26

## 2022-09-02 RX ADMIN — Medication 1 TABLET(S): at 08:23

## 2022-09-02 RX ADMIN — SERTRALINE 50 MILLIGRAM(S): 25 TABLET, FILM COATED ORAL at 08:23
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
## 2022-09-02 NOTE — DISCHARGE NOTE BEHAVIORAL HEALTH - NSBHDCSWCOMMENTSFT_PSY_A_CORE
Discharge summary E-Mailed to [facility] [email or web address]  on 9/2 at 3pm

## 2022-09-02 NOTE — PROGRESS NOTE BEHAVIORAL HEALTH - NSBHATTESTBILLING_PSY_A_CORE
[id number] Inpatient care - low complexity - 15 minutes
[id number]-Tdbtzwmloa Inpatient care - moderate complexity - 25 minutes

## 2022-09-02 NOTE — DISCHARGE NOTE BEHAVIORAL HEALTH - NSBHDCAGENCY2FT_PSY_A_CORE
[facility] Outpatient Mental Health [facility]

## 2022-09-02 NOTE — DISCHARGE NOTE BEHAVIORAL HEALTH - NSBHDCDXVALIDYESFT_PSY_A_CORE
Primary Dx Major depressive disorder, severe F32.2. Secondary Dx 1 Generalized anxiety disorder F41.1.

## 2022-09-02 NOTE — PROGRESS NOTE BEHAVIORAL HEALTH - NSBHFUPINTERVALHXFT_PSY_A_CORE
Patient seen and evaluated by the team. As per nursing report no acute events. on approach patient calm and cooperative. Patient logical and linear. Patient expressed feeling better today. Patient denies suicidal ideations. States "I didn't want to kill my self" then expressed she took the pills as a way of getting her boyfriend to allow her to seek treatment. Described experiencing depressive symptoms for sometime and her boyfriend did not want her seeking treatment. Described feeling trapped and dependent financially. Expressed that recently she has been more assertive with him. Verbalized realizing it was a mistake and she is happy to be alive. Expressed remorse. Patient able to contract for safety and appears future oriented. Requested discharge. Verbalized wanting to take her son school shopping this weekend. Patient expressed wanting to be set up with outpatient treatment. Patient started on Zoloft yesterday. Denies any side effects/adverse reactions. No side effects/adverse reactions noted. Patient stated she has an uncle on Lexapro who is doing well but had side effects on Zoloft. Patient educated on both medications, risks and benefits. Patient decided to continue with Zoloft at this time. Writer and patient later discussed the benefits of outpatient therapy and patient learning coping skills to assist patient with dealing with psychosocial stressors. Patient expressed knowing it would be beneficial for her to have someone to talk to. Patient again denied suicidal ideations and verbalized regretting the rash decision she made. Again requested to be discharged, Anticipated discharge this afternoon. Compliant with medication. Does not warrant continued Inpatient hospitalization. Patient does not present an imminent risk to self or others at this time. Patient seen and evaluated by the team. As per nursing report no acute events. on approach patient calm and cooperative. Patient logical and linear. Patient expressed feeling better today. Patient denies suicidal ideations. States "I didn't want to kill my self" then expressed she took the pills as a way of getting her boyfriend to allow her to seek treatment. Described experiencing depressive symptoms for sometime and her boyfriend did not want her seeking treatment. Described feeling trapped and dependent financially. Expressed that recently she has been more assertive with him. Verbalized realizing it was a mistake and she is happy to be alive. Expressed remorse. Patient able to contract for safety and appears future oriented. Requested discharge. Verbalized wanting to take her son school shopping this weekend. Patient expressed wanting to be set up with outpatient treatment. Patient started on Zoloft yesterday. Denies any side effects/adverse reactions. No side effects/adverse reactions noted. Patient stated she has an uncle on Lexapro who is doing well but had side effects on Zoloft. Patient educated on both medications, risks and benefits. Patient decided to continue with Zoloft at this time. Writer and patient later discussed the benefits of outpatient therapy and patient learning coping skills to assist patient with dealing with psychosocial stressors. Patient expressed knowing it would be beneficial for her to have someone to talk to. Patient again denied suicidal ideations and verbalized regretting the rash decision she made. Again requested to be discharged, Anticipated discharge this afternoon. Compliant with medication. Does not warrant continued Inpatient hospitalization. Patient does not present an imminent risk to self or others at this time.    PHQ9: 4 Minimal depression Patient seen and evaluated by the team. As per nursing report no acute events. on approach patient calm and cooperative. Patient logical and linear. Patient expressed feeling better today. Patient denies suicidal ideations. States "I didn't want to kill my self" then expressed she took the pills as a way of getting her boyfriend to allow her to seek treatment. Described experiencing depressive symptoms for sometime and her boyfriend did not want her seeking treatment. Described feeling trapped and dependent financially. Expressed that recently she has been more assertive with him. Verbalized realizing it was a mistake and she is happy to be alive. Expressed remorse. Patient able to contract for safety and appears future oriented. Requested discharge. Verbalized wanting to take her son school shopping this weekend. Patient expressed wanting to be set up with outpatient treatment. Patient started on Zoloft yesterday. Denies any side effects/adverse reactions. No side effects/adverse reactions noted. Patient stated she has an uncle on Lexapro who is doing well but had side effects on Zoloft. Patient educated on both medications, risks and benefits. Patient decided to continue with Zoloft at this time. Writer and patient later discussed the benefits of outpatient therapy and patient learning coping skills to assist patient with dealing with psychosocial stressors. Patient expressed knowing it would be beneficial for her to have someone to talk to. Patient again denied suicidal ideations and verbalized regretting the rash decision she made. Again requested to be discharged, Anticipated discharge this afternoon. Compliant with medication. Does not warrant continued Inpatient hospitalization. Patient does not present an imminent risk to self or others at this time.    PHQ9: 4 Minimal depression    Left message for patients brother Linwood [phone]

## 2022-09-02 NOTE — PROGRESS NOTE BEHAVIORAL HEALTH - NSBHATTESTTYPEVISIT_PSY_A_CORE
JANETH without on-site Attending supervision Attending evaluating patient with JANETH ([id number] code)

## 2022-09-02 NOTE — DISCHARGE NOTE BEHAVIORAL HEALTH - NSBHDCSUICFCTRMIT_PSY_A_CORE
Has been medication compliant, not endorsing any side effects. Patient is future oriented and optimistic about starting Outpatient. Patient verbalizes reasons for living. Patient to use positive coping skills learned during the course of the inpatient hospitalization. Patient verbalized willingness to seek care in moment of crisis. Patient is agreeable that should she have any thoughts of hurting herself or others, she will call 911, return to the ED or call the National Suicide Prevention Lifeline, immediately.

## 2022-09-02 NOTE — DISCHARGE NOTE BEHAVIORAL HEALTH - NSBHDCCONDITIONFT_PSY_A_CORE
Compliant with medication. Does not warrant continued Inpatient hospitalization.  Patient does not present an imminent risk to self or others at this time.
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
## 2022-09-02 NOTE — PROGRESS NOTE BEHAVIORAL HEALTH - NSBHCHARTREVIEWINVESTIGATE_PSY_A_CORE FT
< from: 12 Lead ECG (09.01.22 @ 13:51) >    Ventricular Rate 84 BPM    Atrial Rate 84 BPM    P-R Interval 104 ms    QRS Duration 82 ms    Q-T Interval 360 ms    QTC Calculation(Bazett) 425 ms    P Axis 62 degrees    R Axis 74 degrees    T Axis 33 degrees    Diagnosis Line Sinus rhythm with short [place]  Otherwise normal ECG    < end of copied text >
< from: 12 Lead ECG (09.01.22 @ 13:51) >    Ventricular Rate 84 BPM    Atrial Rate 84 BPM    P-R Interval 104 ms    QRS Duration 82 ms    Q-T Interval 360 ms    QTC Calculation(Bazett) 425 ms    P Axis 62 degrees    R Axis 74 degrees    T Axis 33 degrees    Diagnosis Line Sinus rhythm with short [place]  Otherwise normal ECG    < end of copied text >

## 2022-09-02 NOTE — DISCHARGE NOTE BEHAVIORAL HEALTH - NSBHDCSUICFCTROTHERFT_PSY_A_CORE
Patient and provider identified future stressors as being psychosocial stressors with family, non compliance with medication/outpatient follow up.
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
## 2022-09-02 NOTE — PROGRESS NOTE BEHAVIORAL HEALTH - RISK ASSESSMENT
RF: depression, anxiety, SA, trauma, unemployed    PF: Seeking help
RF: depression, anxiety, SA, trauma, unemployed    PF: Seeking help

## 2022-09-02 NOTE — BH SAFETY PLAN - WARNING SIGN 3
Another mood and warning sign I get is being sad. My relationship not being healthy affects me everyday. I feel like I am more of an employee role rather than an equal partner. 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

## 2022-09-02 NOTE — PROGRESS NOTE BEHAVIORAL HEALTH - NSBHCHARTREVIEWLAB_PSY_A_CORE FT
Complete Blood Count + Automated Diff (09.01.22 @ 02:09)   WBC Count: 4.58 K/uL   RBC Count: 4.54 M/uL   Hemoglobin: 13.4 g/dL   Hematocrit: 38.7    Mean Cell Volume: 85.2 fL   Mean Cell Hemoglobin: 29.5 pg   Mean Cell Hemoglobin Conc: 34.6 g/dL   Red Cell Distrib Width: 12.9    Platelet Count - Automated: 270 K/uL   Auto Neutrophil #: 1.85 K/uL   Auto Lymphocyte #: 2.19 K/uL   Auto Monocyte #: 0.34 K/uL   Auto Eosinophil #: 0.17 K/uL   Auto Basophil #: 0.02 K/uL   Auto Neutrophil %: 40.5  Auto Lymphocyte %: 47.8   Auto Monocyte %: 7.4   Auto Eosinophil %: 3.7   Auto Basophil %: 0.4   Auto Immature Granulocyte %: 0.2: (Includes meta, myelo and promyelocytes)   Nucleated RBC: 0 /100 WBCs
Complete Blood Count + Automated Diff (09.01.22 @ 02:09)   WBC Count: 4.58 K/uL   RBC Count: 4.54 M/uL   Hemoglobin: 13.4 g/dL   Hematocrit: 38.7    Mean Cell Volume: 85.2 fL   Mean Cell Hemoglobin: 29.5 pg   Mean Cell Hemoglobin Conc: 34.6 g/dL   Red Cell Distrib Width: 12.9    Platelet Count - Automated: 270 K/uL   Auto Neutrophil #: 1.85 K/uL   Auto Lymphocyte #: 2.19 K/uL   Auto Monocyte #: 0.34 K/uL   Auto Eosinophil #: 0.17 K/uL   Auto Basophil #: 0.02 K/uL   Auto Neutrophil %: 40.5  Auto Lymphocyte %: 47.8   Auto Monocyte %: 7.4   Auto Eosinophil %: 3.7   Auto Basophil %: 0.4   Auto Immature Granulocyte %: 0.2: (Includes meta, myelo and promyelocytes)   Nucleated RBC: 0 /100 WBCs

## 2022-09-02 NOTE — PROGRESS NOTE BEHAVIORAL HEALTH - AXIS III
HTN, gastritis, duodenitis and vitamin D deficiency
HTN, gastritis, duodenitis and vitamin D deficiency

## 2022-09-02 NOTE — PROGRESS NOTE BEHAVIORAL HEALTH - NSBHCHARTREVIEWVS_PSY_A_CORE FT
Vital Signs Last 24 Hrs  T(C): 36.7 (02 Sep 2022 07:34), Max: 36.7 (02 Sep 2022 07:34)  T(F): 98 (02 Sep 2022 07:34), Max: 98 (02 Sep 2022 07:34)  HR: 82 (02 Sep 2022 07:34) (82 - 87)  BP: 110/63 (02 Sep 2022 07:34) (103/56 - 110/63)  BP(mean): --  RR: 16 (02 Sep 2022 07:34) (16 - 16)  SpO2: --
Vital Signs Last 24 Hrs  T(C): 37 (01 Sep 2022 12:38), Max: 37 (01 Sep 2022 01:41)  T(F): 98.6 (01 Sep 2022 12:38), Max: 98.6 (01 Sep 2022 01:41)  HR: 86 (01 Sep 2022 12:38) (76 - 89)  BP: 110/68 (01 Sep 2022 12:38) (92/54 - 112/61)  BP(mean): --  RR: 18 (01 Sep 2022 12:38) (16 - 18)  SpO2: 100% (01 Sep 2022 10:19) (100% - 100%)    Parameters below as of 01 Sep 2022 10:19  Patient On (Oxygen Delivery Method): room air

## 2022-09-02 NOTE — PROGRESS NOTE BEHAVIORAL HEALTH - PRIMARY DX
Major depressive disorder, severe Persistent depressive disorder with mixed features, currently severe

## 2022-09-02 NOTE — DISCHARGE NOTE BEHAVIORAL HEALTH - MEDICATION SUMMARY - MEDICATIONS TO TAKE
I will START or STAY ON the medications listed below when I get home from the hospital:    sertraline 50 mg oral tablet  -- 1 tab(s) by mouth once a day x 25 days. Continue as prescribed until told otherwise by yourprovider.  -- Indication: For Depression/anxiety

## 2022-09-02 NOTE — BH SAFETY PLAN - ENVIRONMENT SAFETY 1:
My environment would be safer if I could regularly take an antidepressant that works to help me with all my symptoms.

## 2022-09-02 NOTE — CHART NOTE - NSCHARTNOTEFT_GEN_A_CORE
D/C today. Patients boyfriend picked patient up. Meds sent to [facility] pharmacy as requested by patient. Patient endorses a better mood. Insight and judgment have improved. Denies suicidal/ homicidal ideations. Patient able to contract for safety, stating the importance of compliance with medication and treatment. Compliant with medication. Does not warrant continued Inpatient hospitalization. Writer PA student reviewed D/C papers with patient. Patient verbalized understanding. Patient does not appear to be of imminent danger to self or others at this time.    · Residence	home     Aftercare Appointments:  · Agency Name	[facility]  · Appointment Date/Time	08-Sep-2022 11:30  · Address	[address] [place] [address]  · Phone #	[phone]  · Purpose	Intake Appointment with Judy **IN PERSON**    · Agency Name	[facility]  · Appointment Date/Time	20-Sep-2022 11:30  · Address	[address] [place] [address]  · Phone #	[phone]  · Purpose	Psychiatry Appointment with Dr. Davis **IN PERSON** D/C today. Patients boyfriend picked patient up. Meds sent to [facility] pharmacy as requested by patient. Patient endorses a better mood. Insight and judgment have improved. Denies suicidal/ homicidal ideations. Patient able to contract for safety, stating the importance of compliance with medication and treatment. Compliant with medication. Does not warrant continued Inpatient hospitalization. Writer PA student reviewed D/C papers with patient. Patient verbalized understanding. Patient does not appear to be of imminent danger to self or others at this time.    Writer walked patient downstairs. Boyfriend pleased with patient being discharged. Boyfriend did not voice any concerns for safety.     · Residence	home     Aftercare Appointments:  · Agency Name	[facility]  · Appointment Date/Time	08-Sep-2022 11:30  · Address	[address] [place] [address]  · Phone #	[phone]  · Purpose	Intake Appointment with Judy **IN PERSON**    · Agency Name	[facility]  · Appointment Date/Time	20-Sep-2022 11:30  · Address	[address] [place] [address]  · Phone #	[phone]  · Purpose	Psychiatry Appointment with Dr. Davis **IN PERSON**

## 2022-09-02 NOTE — DISCHARGE NOTE BEHAVIORAL HEALTH - NSBHDCRESPONSEFT_PSY_A_CORE
Patient reports feeling better. Patient denied any suicidal or homicidal ideations. Patient denied any auditory or visual hallucinations. Patient is future oriented, optimistic and motivated to participate in Outpatient treatment. Patient shows no imminent danger to self, others at this time. Patient understands and verbalized agreement with treatment plan and following up with outpatient. Psychoeducation provided regarding diagnosis, medications, treatment and follow up. Risks, benefits, alternatives discussed, all questions and concerns addressed and answered.

## 2022-09-02 NOTE — DISCHARGE NOTE BEHAVIORAL HEALTH - NSBHDCSUICPROTECTFT_PSY_A_CORE
Patient denies suicidal ideations. Responsibility to family and others, Identifies reasons for living, Has future plans,

## 2022-09-02 NOTE — DISCHARGE NOTE BEHAVIORAL HEALTH - NS MD DC FALL RISK RISK
For information on Fall & Injury Prevention, visit: [email or web address] OR  [email or web address] OR  https://www.cdc.gov/steadi/patient.html
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
## 2022-09-02 NOTE — CHART NOTE - NSCHARTNOTEFT_GEN_A_CORE
Social Work Discharge Note:    Patient is for discharge today. She is alert and oriented x3.  Mood has improved.  Anxiety has decreased.  Insight and judgment have improved.  Suicidal/homicidal ideation denied.    Patient will be discharged to her apartment on [address].  She has been referred to [facility] for continued treatment in the community.     Patient confirmed her phone # [phone]    Patient is aware and agreeable to discharge today.

## 2022-09-02 NOTE — PROGRESS NOTE BEHAVIORAL HEALTH - NSBHATTESTAPPAMEND_PSY_A_CORE
I have personally seen and examined this patient. I fully participated in the care of this patient. I have made amendments to the documentation where appropriate and otherwise agree with the history, physical exam, and plan as documented by the JANETH

## 2022-09-07 DIAGNOSIS — F33.2 MAJOR DEPRESSIVE DISORDER, RECURRENT SEVERE WITHOUT PSYCHOTIC FEATURES: ICD-10-CM

## 2022-09-07 DIAGNOSIS — F41.1 GENERALIZED ANXIETY DISORDER: ICD-10-CM

## 2022-09-07 DIAGNOSIS — K29.80 DUODENITIS WITHOUT BLEEDING: ICD-10-CM

## 2022-09-07 DIAGNOSIS — E11.9 TYPE 2 DIABETES MELLITUS WITHOUT COMPLICATIONS: ICD-10-CM

## 2022-09-07 DIAGNOSIS — K29.70 GASTRITIS, UNSPECIFIED, WITHOUT BLEEDING: ICD-10-CM

## 2022-09-07 DIAGNOSIS — Z56.0 UNEMPLOYMENT, UNSPECIFIED: ICD-10-CM

## 2022-09-07 DIAGNOSIS — R45.851 SUICIDAL IDEATIONS: ICD-10-CM

## 2022-09-07 DIAGNOSIS — E55.9 VITAMIN D DEFICIENCY, UNSPECIFIED: ICD-10-CM

## 2022-09-07 DIAGNOSIS — I10 ESSENTIAL (PRIMARY) HYPERTENSION: ICD-10-CM
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
## 2022-09-07 DIAGNOSIS — Z81.8 FAMILY HISTORY OF OTHER MENTAL AND BEHAVIORAL DISORDERS: ICD-10-CM

## 2022-09-07 DIAGNOSIS — Z20.822 CONTACT WITH AND (SUSPECTED) EXPOSURE TO COVID-19: ICD-10-CM

## 2022-09-07 DIAGNOSIS — T42.4X2A POISONING BY BENZODIAZEPINES, INTENTIONAL SELF-HARM, INITIAL ENCOUNTER: ICD-10-CM

## 2022-09-07 PROBLEM — Z00.00 ENCOUNTER FOR PREVENTIVE HEALTH EXAMINATION: Status: ACTIVE | Noted: 2022-09-07

## 2022-09-07 SDOH — ECONOMIC STABILITY - INCOME SECURITY: UNEMPLOYMENT, UNSPECIFIED: Z56.0

## 2022-09-09 LAB
BENZOYLEGONINE, UR RESULT: 5149 NG/ML — SIGNIFICANT CHANGE UP
BZE UR QL SCN: 5149 NG/ML — SIGNIFICANT CHANGE UP
COCAINE IN-HOUSE INTERPRETATION: POSITIVE
COCAINE UR QL SCN: POSITIVE

## 2022-09-13 ENCOUNTER — NON-APPOINTMENT (OUTPATIENT)
Age: 32
End: 2022-09-13

## 2022-09-13 ENCOUNTER — OUTPATIENT (OUTPATIENT)
Dept: OUTPATIENT SERVICES | Facility: HOSPITAL | Age: 32
LOS: 1 days | Discharge: HOME | End: 2022-09-13

## 2022-09-13 ENCOUNTER — APPOINTMENT (OUTPATIENT)
Dept: PSYCHIATRY | Facility: CLINIC | Age: 32
End: 2022-09-13

## 2022-09-20 ENCOUNTER — APPOINTMENT (OUTPATIENT)
Dept: PSYCHIATRY | Facility: CLINIC | Age: 32
End: 2022-09-20

## 2022-09-22 DIAGNOSIS — F31.81 BIPOLAR II DISORDER: ICD-10-CM

## 2022-09-28 ENCOUNTER — APPOINTMENT (OUTPATIENT)
Dept: PSYCHIATRY | Facility: CLINIC | Age: 32
End: 2022-09-28

## 2022-10-02 ENCOUNTER — INPATIENT (INPATIENT)
Facility: HOSPITAL | Age: 32
LOS: 10 days | Discharge: PSYCHIATRIC OR SUBSTANCE USE FACILITY | End: 2022-10-13
Attending: PSYCHIATRY & NEUROLOGY | Admitting: PSYCHIATRY & NEUROLOGY
Payer: MEDICAID

## 2022-10-02 VITALS
RESPIRATION RATE: 18 BRPM | DIASTOLIC BLOOD PRESSURE: 70 MMHG | TEMPERATURE: 99 F | HEART RATE: 92 BPM | HEIGHT: 62 IN | SYSTOLIC BLOOD PRESSURE: 144 MMHG | OXYGEN SATURATION: 97 % | WEIGHT: 160.06 LBS

## 2022-10-02 LAB
ALBUMIN SERPL ELPH-MCNC: 4.9 G/DL — SIGNIFICANT CHANGE UP (ref 3.5–5.2)
ALP SERPL-CCNC: 91 U/L — SIGNIFICANT CHANGE UP (ref 30–115)
ALT FLD-CCNC: 11 U/L — SIGNIFICANT CHANGE UP (ref 0–41)
ANION GAP SERPL CALC-SCNC: 15 MMOL/L — HIGH (ref 7–14)
APAP SERPL-MCNC: <5 UG/ML — LOW (ref 10–30)
APPEARANCE UR: ABNORMAL
AST SERPL-CCNC: 36 U/L — SIGNIFICANT CHANGE UP (ref 0–41)
BACTERIA # UR AUTO: ABNORMAL
BASOPHILS # BLD AUTO: 0.03 K/UL — SIGNIFICANT CHANGE UP (ref 0–0.2)
BASOPHILS NFR BLD AUTO: 0.5 % — SIGNIFICANT CHANGE UP (ref 0–1)
BILIRUB SERPL-MCNC: 0.7 MG/DL — SIGNIFICANT CHANGE UP (ref 0.2–1.2)
BILIRUB UR-MCNC: NEGATIVE — SIGNIFICANT CHANGE UP
BUN SERPL-MCNC: 18 MG/DL — SIGNIFICANT CHANGE UP (ref 10–20)
CALCIUM SERPL-MCNC: 9.5 MG/DL — SIGNIFICANT CHANGE UP (ref 8.4–10.4)
CHLORIDE SERPL-SCNC: 101 MMOL/L — SIGNIFICANT CHANGE UP (ref 98–110)
CK SERPL-CCNC: 227 U/L — HIGH (ref 0–225)
CO2 SERPL-SCNC: 21 MMOL/L — SIGNIFICANT CHANGE UP (ref 17–32)
COLOR SPEC: YELLOW — SIGNIFICANT CHANGE UP
CREAT SERPL-MCNC: 0.9 MG/DL — SIGNIFICANT CHANGE UP (ref 0.7–1.5)
DIFF PNL FLD: ABNORMAL
EGFR: 88 ML/MIN/1.73M2 — SIGNIFICANT CHANGE UP
EOSINOPHIL # BLD AUTO: 0.02 K/UL — SIGNIFICANT CHANGE UP (ref 0–0.7)
EOSINOPHIL NFR BLD AUTO: 0.3 % — SIGNIFICANT CHANGE UP (ref 0–8)
EPI CELLS # UR: 5 /HPF — SIGNIFICANT CHANGE UP (ref 0–5)
ETHANOL SERPL-MCNC: <10 MG/DL — SIGNIFICANT CHANGE UP
GLUCOSE SERPL-MCNC: 77 MG/DL — SIGNIFICANT CHANGE UP (ref 70–99)
GLUCOSE UR QL: SIGNIFICANT CHANGE UP
HCG SERPL-ACNC: <0.6 MIU/ML — SIGNIFICANT CHANGE UP
HCT VFR BLD CALC: 43.7 % — SIGNIFICANT CHANGE UP (ref 37–47)
HGB BLD-MCNC: 14.9 G/DL — SIGNIFICANT CHANGE UP (ref 12–16)
HYALINE CASTS # UR AUTO: 4 /LPF — SIGNIFICANT CHANGE UP (ref 0–7)
IMM GRANULOCYTES NFR BLD AUTO: 0.5 % — HIGH (ref 0.1–0.3)
KETONES UR-MCNC: ABNORMAL
LEUKOCYTE ESTERASE UR-ACNC: NEGATIVE — SIGNIFICANT CHANGE UP
LYMPHOCYTES # BLD AUTO: 0.99 K/UL — LOW (ref 1.2–3.4)
LYMPHOCYTES # BLD AUTO: 16.2 % — LOW (ref 20.5–51.1)
MCHC RBC-ENTMCNC: 29.2 PG — SIGNIFICANT CHANGE UP (ref 27–31)
MCHC RBC-ENTMCNC: 34.1 G/DL — SIGNIFICANT CHANGE UP (ref 32–37)
MCV RBC AUTO: 85.5 FL — SIGNIFICANT CHANGE UP (ref 81–99)
MONOCYTES # BLD AUTO: 0.34 K/UL — SIGNIFICANT CHANGE UP (ref 0.1–0.6)
MONOCYTES NFR BLD AUTO: 5.6 % — SIGNIFICANT CHANGE UP (ref 1.7–9.3)
NEUTROPHILS # BLD AUTO: 4.7 K/UL — SIGNIFICANT CHANGE UP (ref 1.4–6.5)
NEUTROPHILS NFR BLD AUTO: 76.9 % — HIGH (ref 42.2–75.2)
NITRITE UR-MCNC: NEGATIVE — SIGNIFICANT CHANGE UP
NRBC # BLD: 0 /100 WBCS — SIGNIFICANT CHANGE UP (ref 0–0)
PH UR: 5.5 — SIGNIFICANT CHANGE UP (ref 5–8)
PLATELET # BLD AUTO: 282 K/UL — SIGNIFICANT CHANGE UP (ref 130–400)
POTASSIUM SERPL-MCNC: 6 MMOL/L — CRITICAL HIGH (ref 3.5–5)
POTASSIUM SERPL-SCNC: 6 MMOL/L — CRITICAL HIGH (ref 3.5–5)
PROT SERPL-MCNC: 7.8 G/DL — SIGNIFICANT CHANGE UP (ref 6–8)
PROT UR-MCNC: ABNORMAL
RBC # BLD: 5.11 M/UL — SIGNIFICANT CHANGE UP (ref 4.2–5.4)
RBC # FLD: 12.5 % — SIGNIFICANT CHANGE UP (ref 11.5–14.5)
RBC CASTS # UR COMP ASSIST: 2 /HPF — SIGNIFICANT CHANGE UP (ref 0–4)
SALICYLATES SERPL-MCNC: <0.3 MG/DL — LOW (ref 4–30)
SARS-COV-2 RNA SPEC QL NAA+PROBE: SIGNIFICANT CHANGE UP
SODIUM SERPL-SCNC: 137 MMOL/L — SIGNIFICANT CHANGE UP (ref 135–146)
SP GR SPEC: 1.03 — HIGH (ref 1.01–1.03)
UROBILINOGEN FLD QL: SIGNIFICANT CHANGE UP
WBC # BLD: 6.11 K/UL — SIGNIFICANT CHANGE UP (ref 4.8–10.8)
WBC # FLD AUTO: 6.11 K/UL — SIGNIFICANT CHANGE UP (ref 4.8–10.8)
WBC UR QL: 2 /HPF — SIGNIFICANT CHANGE UP (ref 0–5)

## 2022-10-02 PROCEDURE — 93010 ELECTROCARDIOGRAM REPORT: CPT

## 2022-10-02 PROCEDURE — 99285 EMERGENCY DEPT VISIT HI MDM: CPT

## 2022-10-02 PROCEDURE — 76830 TRANSVAGINAL US NON-OB: CPT | Mod: 26

## 2022-10-02 PROCEDURE — 90792 PSYCH DIAG EVAL W/MED SRVCS: CPT | Mod: 95

## 2022-10-02 RX ORDER — ACTIVATED CHARCOAL 25 G/120ML
100 SUSPENSION, ORAL (FINAL DOSE FORM) ORAL ONCE
Refills: 0 | Status: COMPLETED | OUTPATIENT
Start: 2022-10-02 | End: 2022-10-02

## 2022-10-02 RX ORDER — SODIUM CHLORIDE 9 MG/ML
1000 INJECTION INTRAMUSCULAR; INTRAVENOUS; SUBCUTANEOUS ONCE
Refills: 0 | Status: COMPLETED | OUTPATIENT
Start: 2022-10-02 | End: 2022-10-02

## 2022-10-02 RX ORDER — ACTIVATED CHARCOAL 25 G/120ML
1000 SUSPENSION, ORAL (FINAL DOSE FORM) ORAL ONCE
Refills: 0 | Status: DISCONTINUED | OUTPATIENT
Start: 2022-10-02 | End: 2022-10-02

## 2022-10-02 RX ORDER — KETOROLAC TROMETHAMINE 30 MG/ML
15 SYRINGE (ML) INJECTION ONCE
Refills: 0 | Status: DISCONTINUED | OUTPATIENT
Start: 2022-10-02 | End: 2022-10-02

## 2022-10-02 RX ORDER — ONDANSETRON 8 MG/1
4 TABLET, FILM COATED ORAL ONCE
Refills: 0 | Status: COMPLETED | OUTPATIENT
Start: 2022-10-02 | End: 2022-10-02

## 2022-10-02 RX ORDER — ACTIVATED CHARCOAL 208 MG/ML
100 SUSPENSION ORAL ONCE
Refills: 0 | Status: DISCONTINUED | OUTPATIENT
Start: 2022-10-02 | End: 2022-10-02

## 2022-10-02 RX ADMIN — ONDANSETRON 4 MILLIGRAM(S): 8 TABLET, FILM COATED ORAL at 17:44

## 2022-10-02 RX ADMIN — SODIUM CHLORIDE 1000 MILLILITER(S): 9 INJECTION INTRAMUSCULAR; INTRAVENOUS; SUBCUTANEOUS at 17:33

## 2022-10-02 RX ADMIN — Medication 100 GRAM(S): at 17:00

## 2022-10-02 RX ADMIN — Medication 15 MILLIGRAM(S): at 17:32

## 2022-10-02 NOTE — ED BEHAVIORAL HEALTH ASSESSMENT NOTE - OTHER
fair with somnolent blinking somnolent unable to assess self-employed  somnolent at onset of evaluation; slow to awaken with slow slurred speech throughout evaluation but generally well-mannered and interactive. low cut hair dyed red/orange grossly intact but somnolent in appearance expressive of depression/anxiety symptomatology and desire for treatment [facility] Finesse Rodgers defer denies SI to provider recently reported to ED MD somewhat cooperative but guarded

## 2022-10-02 NOTE — ED PROVIDER NOTE - PROGRESS NOTE DETAILS
RK: pt consent for toxicology consult, toxicology consulted, spoke with Dr. William. RK: pt consent for toxicology consult, toxicology consulted, spoke with Dr. William. Recommended 100g of activated charcoal/sorbitol based on weight. Barney: Pt endorsed to KAYLAH Deluna CP: called telepsych for consult Endorsed from Dr. Corley  Pt w/ zoloft OD, expresses SI  LLQ pain w/ low concern for torsion  US pending  Tox consulted and recommended 4 hours obs then cleared    2200: US negative for torsion, pt comfortable. Medically cleared for psych assessment and hospitalization if necessary CP: Patient tolerated PO. Calm and cooperative.

## 2022-10-02 NOTE — CONSULT NOTE ADULT - SUBJECTIVE AND OBJECTIVE BOX
MEDICAL TOXICOLOGY CONSULT    HPI: 31 Yr female Depression now with intentional OD of 6 x Sertraline 25 mg/pill 1 hour PTA. Patient having nausea, vomiting x 4. Endorsing to LLLQ pain for past few days.      ONSET / TIME of exposure(s): 1 hour PTA    QUANTITY of exposure(s): 6 x Sertraline 25 mg/pill     ROUTE of exposure:  Ingestion     CONTEXT of exposure: Home    ASSOCIATED symptoms: As above    PAST MEDICAL & SURGICAL HISTORY:  Anxiety  Depression, major  Panic disorder    REVIEW OF SYSTEMS:    Negative except HPI above    Vital Signs Last 24 Hrs  T(C): 37 (02 Oct 2022 15:12), Max: 37 (02 Oct 2022 15:12)  T(F): 98.6 (02 Oct 2022 15:12), Max: 98.6 (02 Oct 2022 15:12)  HR: 92 (02 Oct 2022 15:12) (92 - 92)  BP: 144/70 (02 Oct 2022 15:12) (144/70 - 144/70)  RR: 18 (02 Oct 2022 15:12) (18 - 18)  SpO2: 97% (02 Oct 2022 15:12) (97% - 97%)    Parameters below as of 02 Oct 2022 15:12  Patient On (Oxygen Delivery Method): room air    SIGNIFICANT LABORATORY STUDIES:                        14.9   6.11  )-----------( 282      ( 02 Oct 2022 18:31 )             43.7       10-02    137  |  101  |  18  ----------------------------<  77  6.0<HH>   |  21  |  0.9    Ca    9.5      02 Oct 2022 18:31    TPro  7.8  /  Alb  4.9  /  TBili  0.7  /  DBili  x   /  AST  36  /  ALT  11  /  AlkPhos  91  10-02    Urinalysis Basic - ( 02 Oct 2022 18:31 )    Color: Yellow / Appearance: Turbid / S.031 / pH: x  Gluc: x / Ketone: Small  / Bili: Negative / Urobili: <2 mg/dL   Blood: x / Protein: 30 mg/dL / Nitrite: Negative   Leuk Esterase: Negative / RBC: 2 /HPF / WBC 2 /HPF   Sq Epi: x / Non Sq Epi: 5 /HPF / Bacteria: Few    Anion Gap: 15<H> 10-02 @ 18:31  CK: 227<H> 10-02 @ 18:31  Aspirin Level: <0.3<L>  10-02 @ 18:31  Acetaminophen Level:  <5.0<L>  10-02 @ 18:31       MEDICAL TOXICOLOGY CONSULT    HPI: 31 Yr female Depression now with intentional OD of 6 x Sertraline 25 mg/pill 1 hour PTA. Patient having nausea, vomiting x 4. Endorsing to LLLQ pain for past few days.      ONSET / TIME of exposure(s): 1 hour PTA    QUANTITY of exposure(s): 6 x Sertraline 25 mg/pill     ROUTE of exposure:  Ingestion     CONTEXT of exposure: Home    ASSOCIATED symptoms: As above    PAST MEDICAL & SURGICAL HISTORY:  Anxiety  Depression, major  Panic disorder    REVIEW OF SYSTEMS:    Negative except HPI above    Vital Signs Last 24 Hrs  T(C): 37 (02 Oct 2022 15:12), Max: 37 (02 Oct 2022 15:12)  T(F): 98.6 (02 Oct 2022 15:12), Max: 98.6 (02 Oct 2022 15:12)  HR: 92 (02 Oct 2022 15:12) (92 - 92)  BP: 144/70 (02 Oct 2022 15:12) (144/70 - 144/70)  RR: 18 (02 Oct 2022 15:12) (18 - 18)  SpO2: 97% (02 Oct 2022 15:12) (97% - 97%)    Parameters below as of 02 Oct 2022 15:12  Patient On (Oxygen Delivery Method): room air    SIGNIFICANT LABORATORY STUDIES:                        14.9   6.11  )-----------( 282      ( 02 Oct 2022 18:31 )             43.7       10-02    137  |  101  |  18  ----------------------------<  77  6.0<HH>   |  21  |  0.9    Ca    9.5      02 Oct 2022 18:31    TPro  7.8  /  Alb  4.9  /  TBili  0.7  /  DBili  x   /  AST  36  /  ALT  11  /  AlkPhos  91  10-02    Urinalysis Basic - ( 02 Oct 2022 18:31 )    VITAL SIGNS: I have reviewed nursing notes and confirm.  CONSTITUTIONAL: Well-developed; well-nourished; in no acute distress.  SKIN: Skin exam is warm and dry, no acute rash.  HEAD: Normocephalic; atraumatic.  EYES: PERRL, EOM intact; conjunctiva and sclera clear.  ENT: No nasal discharge; airway clear. TMs clear.  NECK: Supple; non tender.  CARD: S1, S2 normal; no murmurs, gallops, or rubs. Regular rate and rhythm.  RESP: No wheezes, rales or rhonchi.  ABD: Normal bowel sounds; soft; non-distended; non-tender; no hepatosplenomegaly.  EXT: Normal ROM. No clubbing, cyanosis or edema.  LYMPH: No acute cervical adenopathy.  NEURO: Alert, oriented. Grossly unremarkable. No focal deficits.  PSYCH: Cooperative.      Color: Yellow / Appearance: Turbid / S.031 / pH: x  Gluc: x / Ketone: Small  / Bili: Negative / Urobili: <2 mg/dL   Blood: x / Protein: 30 mg/dL / Nitrite: Negative   Leuk Esterase: Negative / RBC: 2 /HPF / WBC 2 /HPF   Sq Epi: x / Non Sq Epi: 5 /HPF / Bacteria: Few    Anion Gap: 15<H> 10-02 @ 18:31  CK: 227<H> 10- @ 18:31  Aspirin Level: <0.3<L>  10- @ 18:31  Acetaminophen Level:  <5.0<L>  10- @ 18:31

## 2022-10-02 NOTE — ED PROVIDER NOTE - OBJECTIVE STATEMENT
31-year-old female with past medical history of suicide attempt 1 month ago depression who presented with Zoloft overdose.  She is not completely forthcoming and initially denied attempting suicide.  Upon further probing she admits to a suicide attempt took 6 pills of 50 mg Zoloft that she was started on from her last ED visit 1 month ago for suicide attempt.  Patient had nausea and 4 episodes of nonbloody nonbilious vomiting.  In addition, the patient has been having left lower quadrant abdominal pain for the past 3 days and she has a history of left oriented cyst, unknown size.  She has also been having vaginal bleeding during this duration of which she thinks is her menstrual period.  No history abdominal surgery.  Denies fever chills back pain urinary symptoms vaginal symptoms. 31-year-old female with past medical history of suicide attempt 1 month ago depression who presented with Zoloft overdose.  She is not completely forthcoming and initially denied attempting suicide.  Upon further probing she admits to a suicide attempt took 6 pills of 50 mg Zoloft 30 min pta that she was started on from her last ED visit 1 month ago for suicide attempt.  Patient had nausea and 4 episodes of nonbloody nonbilious vomiting.  In addition, the patient has been having left lower quadrant abdominal pain for the past 3 days and she has a history of left oriented cyst, unknown size.  She has also been having vaginal bleeding during this duration of which she thinks is her menstrual period.  No history abdominal surgery.  Denies fever chills back pain urinary symptoms vaginal symptoms.

## 2022-10-02 NOTE — CONSULT NOTE ADULT - ATTENDING COMMENTS
I personally discussed with ED team. I reviewed the medical toxicology fellow’s note (as assigned above), and agree with the findings and plan except as documented in my note.    Intentional sertraline ingestion.  Otherwise well.  Labs and EKG ok.  Medically cleared for psych eval and dispo.      --Please call with any further questions    Darien

## 2022-10-02 NOTE — ED PROVIDER NOTE - CLINICAL SUMMARY MEDICAL DECISION MAKING FREE TEXT BOX
32 yo woman w/ hx of depression p/w zoloft overdose  Discussed w/ tox and recommended labs, ECG and 4 hours monitoring  Endorsed to me from prior team  Pt stable, labs unremarkable and cleared medically for psych assessment   Psych determined need for involuntary admission

## 2022-10-02 NOTE — ED PROVIDER NOTE - ATTENDING CONTRIBUTION TO CARE
.    30 y/o F pmh depression, prior suicide attempt 1mo ago for Xanax OD, p/w 6 Zoloft 50mg tab about 30 min prior to arrival, in apparent suicide attempt.. + nb vomiting x4  afterwards. c/o also intermittent LLQ x 3mo, Pt has some pain today, but none now. No vaginal dc, urinary sx or back pain. LMP now. no cp, or sob, back pain. No etoh or other drugs. No hallucination or HI. + hx L ovarian cyst.    CONSTITUTIONAL: NAD  SKIN: Warm dry  HEAD: NCAT  EYES: NL inspection  ENT: MMM  NECK: Supple; non tender.  CARD: RRR  RESP: CTAB  ABD: S/NT no R/G  EXT: no pedal edema  NEURO: Grossly unremarkable  PSYCH: Cooperative    IMP: SI, Zoloft OD, abd pain/ pelvic pain. Torsion seems unlikely given clinical presentation.  P: 1:1, labs, pel US, UA, tox and psyche consult, reassess.

## 2022-10-02 NOTE — ED BEHAVIORAL HEALTH ASSESSMENT NOTE - RISK ASSESSMENT
vomiting and diarrhea Pertinent known risk and protective factors are noted in documentation above Moderate Acute Suicide Risk High Acute Suicide Risk

## 2022-10-02 NOTE — ED PROVIDER NOTE - NS ED ROS FT
Constitutional: No fever   Eyes:  No visual changes  Ears:  No hearing changes  Neck: No neck pain  Cardiac:  No chest pain  Respiratory:  No SOB   GI:  No diarrhea +abdominal pain +nausea +vomiting  :  No dysuria  MS:  No back pain  Neuro:  No headache or weakness.  No LOC  Skin:  No skin rash
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
## 2022-10-02 NOTE — ED BEHAVIORAL HEALTH ASSESSMENT NOTE - PAST PSYCHOTROPIC MEDICATION
per psandrae; has previously been on Hydroxyzine 25 mg 3/day for ~2 weeks in 2019 corresponding with an ER visit per psandrae; has previously been on Hydroxyzine 25 mg 3/day for ~2 weeks in 2019 corresponding with an ER visit  Zoloft 50mg in Sept 2022 with N/Dizziness/increased insomnia
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
## 2022-10-02 NOTE — ED BEHAVIORAL HEALTH ASSESSMENT NOTE - OTHER PAST PSYCHIATRIC HISTORY (INCLUDE DETAILS REGARDING ONSET, COURSE OF ILLNESS, INPATIENT/OUTPATIENT TREATMENT)
Previous Dx:  MDD, Anxiety, Panic  Previous Med Trials: has previously been on Hydroxyzine 25 mg 3/day for ~2 weeks in 2019 corresponding with an ER visit  Previous IP treatment: denies  Previous SA:  3x (overdosing at [age] years old; OD on Xanax 9/1/22 (8-10mg 1mg tab); OD Zoloft 50mg 7 tabs 10/2/22)  Cutting:  superficial  Current MH treatment:    Violence/Legal: denies Previous Dx:  MDD, Anxiety, Panic  Previous Med Trials: has previously been on Hydroxyzine 25 mg 3/day for ~2 weeks in 2019 corresponding with an ER visit; Zoloft 50mg in Sept 2022 with N/Dizziness/increased insomnia  Previous IP treatment: denies  Previous SA:  3x (overdosing at [age] years old; OD on Xanax 9/1/22 (8-10mg 1mg tab); OD Zoloft 50mg 7 tabs 10/2/22)  Cutting:  superficial  Current  treatment:  [facility]; therapist Judy (intake completed); psychiatrist intake not completed and scheduled in October  Violence/Legal: denies
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
## 2022-10-02 NOTE — ED PROVIDER NOTE - PHYSICAL EXAMINATION
CONSTITUTIONAL: in no acute distress  SKIN: warm, dry  HEAD: Normocephalic  EYES: no conjunctival erythema  ENT: no nasal discharge, airway clear  NECK: full ROM, non-tender  CARD: regular rate and rhythm  RESP: normal respiratory effort, no wheezes, rales or rhonchi  ABD: soft, non-distended, LLQ abdominal tenderness  Pelvic: chaperone by PCA Josie, no CMT tenderness  EXT: moving all extremities spontaneously  NEURO: alert and oriented x3 ambulating without issues moving all extremities   PSYCH: guarded but cooperative, sad appearing, teary at times

## 2022-10-02 NOTE — ED ADULT TRIAGE NOTE - CHIEF COMPLAINT QUOTE
Patient presents to ED c/o nausea, vomiting and dizziness after ingestion of 6 Zoloft tablets Patient presents to ED c/o nausea, vomiting and dizziness after ingestion of 6 Zoloft tablets. Patient states this was accidental and denies suicidal/ homicidal ideations.

## 2022-10-02 NOTE — CONSULT NOTE ADULT - ASSESSMENT
·	Zoloft (Sertraline) is an SSRI. Usual presentation of SSRI overdose can have ataxia, sedation, anxiety, tremor, seizures. CVS involvement can show QRS/QT interval changes. Life threatening overdoses can develop serotonin syndrome which includes a triad of neuromuscular hyperactivity, autonomic instability and mental status changes.   ·	Give Activated charcoal (100 gms) if the patient is willing to take.   ·	Serum Tox Screen is -ve. Patient can be cleared from Toxicology standpoint.   ·	Investigate medical/surgical causes of LLQ abdominal pain.   ·	Will need psyche input for further disposition.     Thank you for the consult.  ·	Zoloft (Sertraline) is an SSRI. Usual presentation of SSRI overdose can have ataxia, sedation, anxiety, tremor, seizures. CVS involvement can show QRS/QT interval changes. Life threatening overdoses can develop serotonin syndrome which includes a triad of neuromuscular hyperactivity, autonomic instability and mental status changes.   ·	Give Activated charcoal (100 gms) if the patient is willing to take.   ·	Serum Tox Screen is -ve. Patient can be cleared from Toxicology standpoint.   ·	Investigate medical/surgical causes of LLQ abdominal pain.   ·	Will need psyche input for further disposition.     Thank you for the consult.     I personally discussed with ED team. I reviewed the medical toxicology fellow’s note (as assigned above), and agree with the findings and plan except as documented in my note.    Unclear intention sertraline ingestion.  Otherwise well.  Labs and EKG ok.  Medically cleared for psych eval and dispo.      --Please call with any further questions    Darien    [phone] (pager)     ·	Zoloft (Sertraline) is an SSRI. Usual presentation of SSRI overdose can have ataxia, sedation, anxiety, tremor, seizures. CVS involvement can show QRS/QT interval changes. Life threatening overdoses can develop serotonin syndrome which includes a triad of neuromuscular hyperactivity, autonomic instability and mental status changes.   ·	Give Activated charcoal (100 gms) if the patient is willing to take.   ·	Serum Tox Screen is -ve. Patient can be cleared from Toxicology standpoint.   ·	Investigate medical/surgical causes of LLQ abdominal pain.   ·	Will need psyche input for further disposition.     Thank you for the consult.       [phone] (pager)

## 2022-10-02 NOTE — ED ADULT NURSE NOTE - CHIEF COMPLAINT QUOTE
Patient presents to ED c/o nausea, vomiting and dizziness after ingestion of 6 Zoloft tablets. Patient states this was accidental and denies suicidal/ homicidal ideations.

## 2022-10-03 DIAGNOSIS — T43.221A POISONING BY SELECTIVE SEROTONIN REUPTAKE INHIBITORS, ACCIDENTAL (UNINTENTIONAL), INITIAL ENCOUNTER: ICD-10-CM

## 2022-10-03 DIAGNOSIS — F41.9 ANXIETY DISORDER, UNSPECIFIED: ICD-10-CM

## 2022-10-03 LAB
ANION GAP SERPL CALC-SCNC: 11 MMOL/L — SIGNIFICANT CHANGE UP (ref 7–14)
BUN SERPL-MCNC: 15 MG/DL — SIGNIFICANT CHANGE UP (ref 10–20)
CALCIUM SERPL-MCNC: 8.8 MG/DL — SIGNIFICANT CHANGE UP (ref 8.4–10.5)
CHLORIDE SERPL-SCNC: 111 MMOL/L — HIGH (ref 98–110)
CK SERPL-CCNC: 187 U/L — SIGNIFICANT CHANGE UP (ref 0–225)
CO2 SERPL-SCNC: 22 MMOL/L — SIGNIFICANT CHANGE UP (ref 17–32)
CREAT SERPL-MCNC: 0.8 MG/DL — SIGNIFICANT CHANGE UP (ref 0.7–1.5)
EGFR: 101 ML/MIN/1.73M2 — SIGNIFICANT CHANGE UP
GLUCOSE SERPL-MCNC: 100 MG/DL — HIGH (ref 70–99)
POTASSIUM SERPL-MCNC: 4 MMOL/L — SIGNIFICANT CHANGE UP (ref 3.5–5)
POTASSIUM SERPL-SCNC: 4 MMOL/L — SIGNIFICANT CHANGE UP (ref 3.5–5)
SODIUM SERPL-SCNC: 144 MMOL/L — SIGNIFICANT CHANGE UP (ref 135–146)

## 2022-10-03 PROCEDURE — 99223 1ST HOSP IP/OBS HIGH 75: CPT

## 2022-10-03 PROCEDURE — 99214 OFFICE O/P EST MOD 30 MIN: CPT

## 2022-10-03 PROCEDURE — L9997: CPT

## 2022-10-03 RX ORDER — BUPROPION HYDROCHLORIDE 150 MG/1
150 TABLET, EXTENDED RELEASE ORAL DAILY
Refills: 0 | Status: DISCONTINUED | OUTPATIENT
Start: 2022-10-03 | End: 2022-10-05
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
## 2022-10-03 NOTE — ED BEHAVIORAL HEALTH NOTE - BEHAVIORAL HEALTH NOTE
==================        PRE-HOSPITAL COURSE        ===================       SOURCE:  Secondhand EMR documentation.        DETAILS:  Patient presents self to ED; chief complaint of abdominal pain.     ==============    ED COURSE:        ===========        SOURCE:  RN and secondhand EMR documentation.         ARRIVAL:  Patient noted to be cooperative with ED protocol. Patient gowned, wanded, and placed in private room on 1:1 at arms length. Patient presents with good hygiene/grooming.  Slight amount blood on side of neck, RN unable to see origin.        BELONGINGS:  None notable.        BEHAVIOR: Blood/urine provided for routine labs without noted incident. No SI/HI/AH/VH elicited per RN; patient not engaging with RN. Patient is alert, oriented, and make eye contact; speech of quiet volume and normal rate accompanied by logical thought process. Patient has been in hospital bed while in ED.   TREATMENT: Patient did not require medication intervention while in ED; remains in behavioral control.    Visitors: Patient presently unaccompanied by social supports while in ED.

## 2022-10-03 NOTE — BH CONSULTATION LIAISON PROGRESS NOTE - NSBHCONSULTRECOMMENDOTHER_PSY_A_CORE FT
Contact Lens Fitting Policy    ? Please wear your new trial lenses every day up until your contact lens check to ensure that you are happy with them prior to receiving your final contact lens prescription.    ? Please make sure to come in wearing your new trial lenses to your appointment for at least 2 hours.  If something happens to one or both of your trial lenses or if you are unable to wear them due to comfort, please contact our office prior to your check up appointment at [phone].    ? Follow up contact lens visits, up to 90 days after the initial fitting, are included in the contact lens fitting fee without additional cost.      ? You will receive a prescription for a one year supply of contact lenses after you have had a follow up visit and it is determined that the trial lenses fit appropriately.    ? Follow up care is the responsibility of the patient.  Failure to schedule or keep appointments to check the fit of the contact lenses within the initial 90 day fitting period will require an additional fitting and incur additional charges.  In some cases, a complete eye exam may be required.    ? Reordering of gas permeable contact lenses due to change in material or lens design during the contact lens fitting period may incur additional costs than what was paid or covered by insurance for the initial pair of lenses.    ? Your contact lens prescription is valid for one (1) year from the date it was written.  FDA regulations require contact lens fittings be done on an annual basis.      Cataracts    A cataract is the clouding or darkening of the normally clear lens of the eye.  This is often thought of as a part of the natural aging process, but may also result from heredity, injuries, or certain systemic diseases.  Cataracts usually develop slowly over a period of time, but occasionally develop more rapidly.      A complete eye exam with an optometrist can determine if you have cataracts.      Symptoms  develop differently from person to person, but these are some common symptoms that people experience:  · Haziness causing blurred vision  · Colors may seem yellowed  · Increasing need for more light to see clearly  · Changes in eye glass prescription because of increasing density of the lens    Your optometrist can prescribe changes to your glasses or contact lenses to help you see more clearly, until it is recommended that the cataract be surgically removed.  At that time, your optometrist will refer you to an eye surgeon; the following surgery is relatively uncomplicated.      After your eye’s natural lens is removed during cataract surgery, an intraocular lens is inserted in its place.  After surgery, you will be followed by both your eye surgeon and your optometrist to ensure the best outcome possible.    Walter Rodríguez, OD  [phone]      Flashes and Floaters    What are floaters?    The small specks or clouds sometimes moving in your field of vision are called floaters.  Often, you can see them while looking at a plain background, for example a blank wall or blue suzan.    They are actually tiny clumps of gel or cells inside the vitreous, the clear gel-like fluid that fills the inside of your eye.  These objects may look like they are in front of your eye, but they are actually floating inside of it.  What you are seeing are actually the shadows the tiny clumps of gel or cells cast on the retina, the lining of the back of the eye that allows you to see.      Floaters can appear as different shapes such as little dots, circles, lines, clouds, or cobwebs.      What causes floaters?    As you age, the vitreous gel may start to thicken or shrink, forming clumps or strands inside the eye.  The vitreous gel pulls away from the back wall of the eye, causing a posterior vitreous detachment.  This is a common cause of floaters.      Posterior vitreous detachment is more common in people who:   Are nearsighted   Have  undergone cataract operations   Have had YAG laser surgery of the eye   Have had inflammation inside the eye    The appearance of floaters may be alarming, especially if they develop very suddenly.   You should contact your eye doctor right away if you develop new floaters, especially if you are over 45 years of age.    Are floaters ever serious?    The retina can tear if the shrinking vitreous gel pulls away from the wall of the eye.  This sometimes causes a small amount of bleeding in the eye that may appear as new floaters.      A torn retina is always a serious problem, since it can lead to a retinal detachment.  You should see your eye doctor as soon as possible if:   Even one new floater appears suddenly   You see sudden flashes of light    If you notice other symptoms, like the loss of side vision, you should see your eye doctor.      Can floaters be removed?    Floaters may be a symptom of a tear in the retina, which is a serious problem.  If a retinal tear is not treated, the retina may detach from the back of the eye.  The only treatment for a detached retina is surgery.    Other floaters are harmless and fade over time or become less bothersome, requiring no treatment.  Surgery to remove floaters is almost never required.  Vitamin therapy will not cause floaters to disappear.    Even if you have had floaters for years, you should schedule an eye examination with your eye doctor if you suddenly notice new ones.    What causes flashing lights?    When the vitreous gel rubs or pulls on the retina, you may see what look like flashing lights or lightening streaks.  You may have experienced this same sensation if you have ever been hit in the eye and seen \"stars\".    The flashes of light can appear off and on for several weeks or months.  As we grow older, it is more common to experience flashes.  If you notice the sudden appearance of light flashes, you should contact your eye doctor immediately in case the  retina has been torn.     1. We recommend Atarax 50mg P.o Q 8 hours PRN for anxiety and insomnia   2. We recommend  Haldol 2.5mg P.O Q 6 hours PRN  , Ativan 1mg P.O Q 6 hours PRN  and Benadryl 25mg P.O Q 6 hours PRN in combination for agitation .  3. Transfer to the inpatient psychiatry floor upon medical clearance and bed availability

## 2022-10-04 LAB
CULTURE RESULTS: SIGNIFICANT CHANGE UP
SPECIMEN SOURCE: SIGNIFICANT CHANGE UP

## 2022-10-04 PROCEDURE — 99214 OFFICE O/P EST MOD 30 MIN: CPT

## 2022-10-04 RX ORDER — ESCITALOPRAM OXALATE 10 MG/1
10 TABLET, FILM COATED ORAL DAILY
Refills: 0 | Status: DISCONTINUED | OUTPATIENT
Start: 2022-10-05 | End: 2022-10-13

## 2022-10-04 RX ORDER — ESCITALOPRAM OXALATE 10 MG/1
10 TABLET, FILM COATED ORAL ONCE
Refills: 0 | Status: COMPLETED | OUTPATIENT
Start: 2022-10-04 | End: 2022-10-04

## 2022-10-04 RX ADMIN — ESCITALOPRAM OXALATE 10 MILLIGRAM(S): 10 TABLET, FILM COATED ORAL at 10:02

## 2022-10-04 NOTE — BH CONSULTATION LIAISON PROGRESS NOTE - NSBHCONSULTMEDANXIETY_PSY_A_CORE FT
Recommend Atarax 50mg P.O Q 8 hours PRN for anxiety and insomnia 
Recommend Atarax 50mg P.O Q 8 hours PRN for anxiety and insomnia

## 2022-10-04 NOTE — BH CONSULTATION LIAISON PROGRESS NOTE - NSBHPROGSUIC_PSY_A_CORE
Nurses notes (i.e., Visit Notes) reviewed.      Subjective:  The patient is a 30-year-old female here for evaluation of post viral symptoms.  She feels she may have had COVID-19 in October of 2020, although at the time she tested negative.  She had many of the symptoms including cough, loss of taste and smell, and congestion.  She did not have any GI symptoms.    Since that time she has been concerned about ongoing shortness of breath and palpitations.  What bothers her the most is the palpitations because they cause her to become anxious.  She states when walking up 1 and half flights of stairs she will start to feel her heart racing.  Her fit bit has indicated that her heart rate is up to 150, with little activity.  She has minimal cough remaining.  She states she is not wheezing.  She does not feel she needs an inhaler.  She feels the palpitations are the inciting factor and then she will feel short of breath.  She does have significant anxiety which may be contributing as well.    Objective:  Visit Vitals  /70   Pulse 80   Temp 98.3 °F (36.8 °C) (Tympanic)   Wt 78.1 kg   SpO2 97%   BMI 29.10 kg/m²   GENERAL:  Alert, no acute distress.  HEART:  Regular rate and rhythm.  S1 normal, S2 normal.  No S3, S4.  No murmurs, clicks, or gallops.  LUNGS:  No respiratory distress.  Chest symmetric with normal anterior/posterior diameter.  No chest deformities noted.  Lungs clear to auscultation bilaterally.  No wheezes, rales, or rhonchi.    Assessment/plan:  1. Palpitations:  This is a common symptom post COVID.  We did discuss checking COVID antibodies, although discussed that if she had the infection in October, antibodies could be waning by this time.  She would be interested to know if she has antibodies.  Also recommended EKG and Holter monitor.  She will have a CBC and TSH today to rule out other causes of palpitations.  2. Dyspnea:  Chest x-ray is normal.  She does not feel she is really struggling with her 
breathing or having any wheezing and declines an inhaler which was offered to her.    Will discuss all results when available.    This has been fully explained to the patient, who indicates understanding.      
no
no
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
## 2022-10-04 NOTE — BH CONSULTATION LIAISON PROGRESS NOTE - NSBHATTESTBILLINGAW_PSY_A_CORE
63770-[facility] Inpatient care - high complexity - 35 minutes
[id number]-[facility] hospital care - moderate complexity 30-39 minutes

## 2022-10-04 NOTE — BH CONSULTATION LIAISON PROGRESS NOTE - CURRENT MEDICATION
MEDICATIONS  (STANDING):    MEDICATIONS  (PRN):  
MEDICATIONS  (STANDING):  buPROPion XL (24-Hour) 150 milliGRAM(s) Oral daily    MEDICATIONS  (PRN):

## 2022-10-04 NOTE — BH CONSULTATION LIAISON PROGRESS NOTE - NSBHCONSULTRECOMMENDOTHER_PSY_A_CORE FT
1. Recommend starting Lexapro 10mg P.O Daily   2. Patient can be transferred to the inpatient psychiatry floor upon medical clearance and bed avalability  1. Recommend starting Lexapro 10mg P.O Daily   2. Patient can be transferred to the inpatient psychiatry floor upon medical clearance and bed availability  1. Recommend starting Lexapro 10mg P.O Daily   2. Patient can be transferred to the inpatient psychiatry floor upon medical clearance and bed availability , legals changed to 9.13

## 2022-10-04 NOTE — ED BEHAVIORAL HEALTH NOTE - BEHAVIORAL HEALTH NOTE
=============  Re-assessment Note  =============  SOURCE:  Chart review via secondhand ED documentation.  BEHAVIOR: Per chart, patient was noted to be resting comfortably with no complaints, no active SI/HI/AVH expressed overnight, remained in good behavioral control, no violence/aggression. Patient was noted to remain AAAOx4 with linear thought process.   TREATMENT:  Per chart, patient did not require PRN medications. Patient received her routine dose of Wellbutrin 150MG at 13:02.  VISITORS: None
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
## 2022-10-04 NOTE — ED ADULT NURSE REASSESSMENT NOTE - NS ED NURSE REASSESS COMMENT FT1
Patient is A&ox4. pt denies any pain or discomfort. pt resting comfortably at this time. 1:1 sit maintained for patient safety. no signs of distress noted. [facility] report given to LAWRENCE Hawthorne.

## 2022-10-04 NOTE — BH CONSULTATION LIAISON PROGRESS NOTE - NSBHCHARTREVIEWVS_PSY_A_CORE FT
Vital Signs Last 24 Hrs  T(C): 36.8 (03 Oct 2022 19:30), Max: 36.8 (03 Oct 2022 19:30)  T(F): 98.2 (03 Oct 2022 19:30), Max: 98.2 (03 Oct 2022 19:30)  HR: 82 (03 Oct 2022 23:30) (73 - 82)  BP: 115/62 (03 Oct 2022 23:30) (113/58 - 120/60)  BP(mean): --  RR: 18 (03 Oct 2022 23:30) (18 - 18)  SpO2: 98% (03 Oct 2022 23:30) (97% - 98%)    Parameters below as of 03 Oct 2022 23:30  Patient On (Oxygen Delivery Method): room air    
Vital Signs Last 24 Hrs  T(C): 37 (03 Oct 2022 05:37), Max: 37 (02 Oct 2022 15:12)  T(F): 98.6 (03 Oct 2022 05:37), Max: 98.6 (02 Oct 2022 15:12)  HR: 84 (03 Oct 2022 05:37) (78 - 92)  BP: 118/79 (03 Oct 2022 05:37) (110/72 - 144/70)  BP(mean): --  RR: 18 (03 Oct 2022 05:37) (18 - 18)  SpO2: 99% (03 Oct 2022 05:37) (97% - 99%)    Parameters below as of 03 Oct 2022 05:37  Patient On (Oxygen Delivery Method): room air

## 2022-10-04 NOTE — BH CONSULTATION LIAISON PROGRESS NOTE - NSBHCHARTREVIEWLAB_PSY_A_CORE FT
14.9   6.11  )-----------( 282      ( 02 Oct 2022 18:31 )             43.7       10-02    144  |  111<H>  |  15  ----------------------------<  100<H>  4.0   |  22  |  0.8    Ca    8.8      02 Oct 2022 23:29    TPro  7.8  /  Alb  4.9  /  TBili  0.7  /  DBili  x   /  AST  36  /  ALT  11  /  AlkPhos  91  10-02  
                      14.9   6.11  )-----------( 282      ( 02 Oct 2022 18:31 )             43.7       10-02    144  |  111<H>  |  15  ----------------------------<  100<H>  4.0   |  22  |  0.8    Ca    8.8      02 Oct 2022 23:29    TPro  7.8  /  Alb  4.9  /  TBili  0.7  /  DBili  x   /  AST  36  /  ALT  11  /  AlkPhos  91  10-02

## 2022-10-05 DIAGNOSIS — F33.2 MAJOR DEPRESSIVE DISORDER, RECURRENT SEVERE WITHOUT PSYCHOTIC FEATURES: ICD-10-CM

## 2022-10-05 DIAGNOSIS — F19.10 OTHER PSYCHOACTIVE SUBSTANCE ABUSE, UNCOMPLICATED: ICD-10-CM

## 2022-10-05 DIAGNOSIS — F41.1 GENERALIZED ANXIETY DISORDER: ICD-10-CM

## 2022-10-05 LAB
A1C WITH ESTIMATED AVERAGE GLUCOSE RESULT: 5.5 % — SIGNIFICANT CHANGE UP (ref 4–5.6)
APPEARANCE UR: CLEAR — SIGNIFICANT CHANGE UP
BACTERIA # UR AUTO: ABNORMAL
BASOPHILS # BLD AUTO: 0.02 K/UL — SIGNIFICANT CHANGE UP (ref 0–0.2)
BASOPHILS NFR BLD AUTO: 0.5 % — SIGNIFICANT CHANGE UP (ref 0–1)
BILIRUB UR-MCNC: NEGATIVE — SIGNIFICANT CHANGE UP
COD CRY URNS QL: ABNORMAL
COLOR SPEC: YELLOW — SIGNIFICANT CHANGE UP
COVID-19 SPIKE DOMAIN AB INTERP: POSITIVE
COVID-19 SPIKE DOMAIN ANTIBODY RESULT: >250 U/ML — HIGH
DIFF PNL FLD: ABNORMAL
EOSINOPHIL # BLD AUTO: 0.09 K/UL — SIGNIFICANT CHANGE UP (ref 0–0.7)
EOSINOPHIL NFR BLD AUTO: 2.3 % — SIGNIFICANT CHANGE UP (ref 0–8)
EPI CELLS # UR: ABNORMAL /HPF
ESTIMATED AVERAGE GLUCOSE: 111 MG/DL — SIGNIFICANT CHANGE UP (ref 68–114)
GLUCOSE UR QL: NEGATIVE MG/DL — SIGNIFICANT CHANGE UP
HCG UR QL: NEGATIVE — SIGNIFICANT CHANGE UP
HCT VFR BLD CALC: 43.3 % — SIGNIFICANT CHANGE UP (ref 37–47)
HGB BLD-MCNC: 14.4 G/DL — SIGNIFICANT CHANGE UP (ref 12–16)
IMM GRANULOCYTES NFR BLD AUTO: 0.3 % — SIGNIFICANT CHANGE UP (ref 0.1–0.3)
KETONES UR-MCNC: NEGATIVE — SIGNIFICANT CHANGE UP
LEUKOCYTE ESTERASE UR-ACNC: NEGATIVE — SIGNIFICANT CHANGE UP
LYMPHOCYTES # BLD AUTO: 2.15 K/UL — SIGNIFICANT CHANGE UP (ref 1.2–3.4)
LYMPHOCYTES # BLD AUTO: 55.4 % — HIGH (ref 20.5–51.1)
MCHC RBC-ENTMCNC: 28.7 PG — SIGNIFICANT CHANGE UP (ref 27–31)
MCHC RBC-ENTMCNC: 33.3 G/DL — SIGNIFICANT CHANGE UP (ref 32–37)
MCV RBC AUTO: 86.4 FL — SIGNIFICANT CHANGE UP (ref 81–99)
MONOCYTES # BLD AUTO: 0.31 K/UL — SIGNIFICANT CHANGE UP (ref 0.1–0.6)
MONOCYTES NFR BLD AUTO: 8 % — SIGNIFICANT CHANGE UP (ref 1.7–9.3)
NEUTROPHILS # BLD AUTO: 1.3 K/UL — LOW (ref 1.4–6.5)
NEUTROPHILS NFR BLD AUTO: 33.5 % — LOW (ref 42.2–75.2)
NITRITE UR-MCNC: NEGATIVE — SIGNIFICANT CHANGE UP
NRBC # BLD: 0 /100 WBCS — SIGNIFICANT CHANGE UP (ref 0–0)
PH UR: 6 — SIGNIFICANT CHANGE UP (ref 5–8)
PLATELET # BLD AUTO: 267 K/UL — SIGNIFICANT CHANGE UP (ref 130–400)
PROT UR-MCNC: NEGATIVE MG/DL — SIGNIFICANT CHANGE UP
RBC # BLD: 5.01 M/UL — SIGNIFICANT CHANGE UP (ref 4.2–5.4)
RBC # FLD: 12.1 % — SIGNIFICANT CHANGE UP (ref 11.5–14.5)
RBC CASTS # UR COMP ASSIST: ABNORMAL /HPF
SARS-COV-2 IGG+IGM SERPL QL IA: >250 U/ML — HIGH
SARS-COV-2 IGG+IGM SERPL QL IA: POSITIVE
SP GR SPEC: >=1.03 (ref 1.01–1.03)
TSH SERPL-MCNC: 1.32 UIU/ML — SIGNIFICANT CHANGE UP (ref 0.27–4.2)
UROBILINOGEN FLD QL: 0.2 MG/DL — SIGNIFICANT CHANGE UP
WBC # BLD: 3.88 K/UL — LOW (ref 4.8–10.8)
WBC # FLD AUTO: 3.88 K/UL — LOW (ref 4.8–10.8)
WBC UR QL: SIGNIFICANT CHANGE UP /HPF

## 2022-10-05 PROCEDURE — 99232 SBSQ HOSP IP/OBS MODERATE 35: CPT

## 2022-10-05 RX ORDER — HALOPERIDOL DECANOATE 100 MG/ML
5 INJECTION INTRAMUSCULAR EVERY 6 HOURS
Refills: 0 | Status: DISCONTINUED | OUTPATIENT
Start: 2022-10-05 | End: 2022-10-13

## 2022-10-05 RX ORDER — HALOPERIDOL DECANOATE 100 MG/ML
2.5 INJECTION INTRAMUSCULAR EVERY 6 HOURS
Refills: 0 | Status: DISCONTINUED | OUTPATIENT
Start: 2022-10-05 | End: 2022-10-05

## 2022-10-05 RX ORDER — DIPHENHYDRAMINE HCL 50 MG
25 CAPSULE ORAL EVERY 6 HOURS
Refills: 0 | Status: DISCONTINUED | OUTPATIENT
Start: 2022-10-05 | End: 2022-10-05

## 2022-10-05 RX ORDER — DIPHENHYDRAMINE HCL 50 MG
50 CAPSULE ORAL EVERY 6 HOURS
Refills: 0 | Status: DISCONTINUED | OUTPATIENT
Start: 2022-10-05 | End: 2022-10-13

## 2022-10-05 RX ORDER — HYDROXYZINE HCL 10 MG
50 TABLET ORAL EVERY 8 HOURS
Refills: 0 | Status: DISCONTINUED | OUTPATIENT
Start: 2022-10-05 | End: 2022-10-05

## 2022-10-05 RX ORDER — HYDROXYZINE HCL 10 MG
50 TABLET ORAL EVERY 6 HOURS
Refills: 0 | Status: DISCONTINUED | OUTPATIENT
Start: 2022-10-05 | End: 2022-10-13

## 2022-10-05 RX ADMIN — ESCITALOPRAM OXALATE 10 MILLIGRAM(S): 10 TABLET, FILM COATED ORAL at 09:03

## 2022-10-05 NOTE — PATIENT PROFILE BEHAVIORAL HEALTH - NSBHFRQPERS6_PSY_A_CORE
intermittent

## 2022-10-05 NOTE — PROGRESS NOTE BEHAVIORAL HEALTH - NSBHFUPINTERVALHXFT_PSY_A_CORE
Patient seen and examined on IPP. On approach patient presents as depressed. States she had a fight with her boyfriend and wanted to leave the house. he wouldn't let her. She needed a break from him so she took 6-7 pills of Zoloft and told him she needed to go to the hospital. Admits to having suicidal thoughts perviously but at this time she was not suicidal. states she needed a break from him. Knew that if she took the pills she would be able to stay in the hospital for a while. States she knew it would not kill her. States she knows it was stupid. States she stopped taking the Zoloft after last discharge because it started to make her sick. So she did have moments of passive SI. States she followed up with the therapist but had to re-schedule the psychiatrist appointment because her boyfriend would not give her any money to go. States no family to depend on. Describes a low mood, decreased appetite, weight loss, sleeping a lot, no motivation or interest in doing anything, no energy. States she previously did not have a plan or intent. this was spur of the moment. states last week one day she too 1 piece of xanax, cocain and a percocet. Denies any A/V hallucinations. No evidence of psychosis noted. Denies any ETOH use.     Collateral obtained by PA student. See note Patient seen and examined on IPP. On approach patient presents as depressed. States she had a fight with her boyfriend and wanted to leave the house. he wouldn't let her. She needed a break from him so she took 6-7 pills of Zoloft and told him she needed to go to the hospital. Admits to having suicidal thoughts perviously but at this time she was not suicidal. states she needed a break from him. Knew that if she took the pills she would be able to stay in the hospital for a while. States she knew it would not kill her. States she knows it was stupid. States she stopped taking the Zoloft after last discharge because it started to make her sick. So she did have moments of passive SI. States she followed up with the therapist but had to re-schedule the psychiatrist appointment because her boyfriend would not give her any money to go. States no family to depend on. Describes a low mood, decreased appetite, weight loss, sleeping a lot, no motivation or interest in doing anything, no energy. States she previously did not have a plan or intent. this was spur of the moment. states last week one day she too 1 piece of xanax, cocain and a percocet. Denies any A/V hallucinations. No evidence of psychosis noted. Denies any ETOH use.     Collateral obtained by PA student. See note    PHQ9: 4 Minimal depression
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
## 2022-10-05 NOTE — PATIENT PROFILE BEHAVIORAL HEALTH - WILL THE PATIENT ACCEPT THE PFIZER COVID-19 VACCINE IF ELIGIBLE AND IT IS AVAILABLE?
No Cibinqo Pregnancy And Lactation Text: It is unknown if this medication will adversely affect pregnancy or breast feeding.  You should not take this medication if you are currently pregnant or planning a pregnancy or while breastfeeding.

## 2022-10-05 NOTE — PATIENT PROFILE BEHAVIORAL HEALTH - HEALTHCARE QUESTIONS, PROFILE
Discharge Instructions    Thank you for visiting our Emergency Department today.  We appreciate you choosing us for your medical care.      Your diagnosis is: Tremor    Your Doctor was: Dr. TARA Kay    Your discharge Medicines are:  No changes    Please return with any worse or non-improving symptoms, questions or any other concerns you may have. Follow-up with the doctor indicated.       none

## 2022-10-05 NOTE — H&P ADULT - NSHPLABSRESULTS_GEN_ALL_CORE
CAPILLARY BLOOD GLUCOSE          Culture - Urine (collected 10-02-22 @ 18:31)  Source: Clean Catch Clean Catch (Midstream)  Final Report (10-04-22 @ 10:42):    <10,000 CFU/mL Normal Urogenital Benita          < from: US Transvaginal (10.02.22 @ 19:23) >      Enlarged myomatous uterus; fibroid measuring at least 6.0 cm obscures the   endometrium.    Left ovarian cyst likely corpus luteal orhemorrhagic measuring up to at   least 2.0 cm.    < end of copied text >

## 2022-10-05 NOTE — PROGRESS NOTE BEHAVIORAL HEALTH - OTHER
defer low cut hair dyed red/orange Minimally cooperative  fair with somnolent blinking denies SI to provider recently reported to ED MD unable to assess

## 2022-10-05 NOTE — CHART NOTE - NSCHARTNOTEFT_GEN_A_CORE
Patients brother Holly ([phone]) was called twice for collateral. The brother did not answer so writer left a voicemail and provided a phone number for him to call back.

## 2022-10-05 NOTE — PATIENT PROFILE BEHAVIORAL HEALTH - FALL HARM RISK - UNIVERSAL INTERVENTIONS
Bed in lowest position, wheels locked, appropriate side rails in place/Call bell, personal items and telephone in reach/Instruct patient to call for assistance before getting out of bed or chair/Non-slip footwear when patient is out of bed/[place] to call system/Physically safe environment - no spills, clutter or unnecessary equipment/Purposeful Proactive Rounding/Room/bathroom lighting operational, light cord in reach
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
## 2022-10-05 NOTE — H&P ADULT - HISTORY OF PRESENT ILLNESS
30 y/o female with PMH of suicide attempt 1 month ago depression who presented with Zoloft overdose.  Pt states, she took 6 pills of 50 mg Zoloft 30 min PTA  that she was started on from her last ED visit 1 month ago for suicide attempt. However, pt states, she took the pills not necessarily to hurt herself but a way to get out of the house due to an argument with her boyfriend. Denies ETOH and drug use, denies SI/HI ideation at the time of evaluation. Denies cough, CP, SOB, abdominal pain.

## 2022-10-05 NOTE — H&P ADULT - NSHPPHYSICALEXAM_GEN_ALL_CORE
Vital Signs Last 24 Hrs  T(C): 36.6 (05 Oct 2022 04:20), Max: 36.9 (04 Oct 2022 15:13)  T(F): 97.9 (05 Oct 2022 04:20), Max: 98.4 (04 Oct 2022 15:13)  HR: 81 (05 Oct 2022 04:20) (74 - 81)  BP: 103/68 (05 Oct 2022 04:20) (103/68 - 124/68)  BP(mean): --  RR: 16 (05 Oct 2022 04:20) (16 - 18)  SpO2: 100% (04 Oct 2022 23:15) (100% - 100%)    Parameters below as of 04 Oct 2022 23:15  Patient On (Oxygen Delivery Method): room air        Constitutional: NAD, A&O x3    Eyes: PERRLA, no conjuctivitis    Neck: no lymphadenopathy    Respiratory: +air entry, no rales, no rhonchi, no wheezes    Cardiovascular: +S1 and S2, regular rate and rhythm    Gastrointestinal: +BS, soft, non-tender, not distended    Extremities:  no edema, no calf tenderness    Neurological: sensation intact, ROM equal B/L, CN II-XII intact    Skin: no rashes, normal turgor

## 2022-10-05 NOTE — CHART NOTE - NSCHARTNOTEFT_GEN_A_CORE
Spoke with patients boyfrienalex Newell ([phone]) for collateral. Writer called to ask what has been going on at home and if there were any recent changes in the patients behavior. The patients boyfriend stated "she is fine.". He begins to explain that she gets easily overwhelmed and that there are no issues at home. He stated that since 2019 she has been "angry" but has noticed it more recently in the last year. He states that she is just "overreacting". The patient mentioned to the boyfriend that she has not been eating and her appetite has decreased but he didn't notice that. He mentioned that she stopped taking her medication abruptly because it made her feel sick. He says that she is here because she wants to get away from the house. He also states that her behavior is more due to her "anger issues" and she is not suicidal, instead she is just "acting out".
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
## 2022-10-05 NOTE — H&P ADULT - ASSESSMENT
2 y/o female with PMH of suicide attempt 1 month ago depression admitted for depression.   A/P:  -Admit to IPP  -Psyc evaluation and mgmt

## 2022-10-06 ENCOUNTER — NON-APPOINTMENT (OUTPATIENT)
Age: 32
End: 2022-10-06

## 2022-10-06 LAB
AMPHET UR-MCNC: NEGATIVE — SIGNIFICANT CHANGE UP
BARBITURATES UR SCN-MCNC: NEGATIVE — SIGNIFICANT CHANGE UP
BENZODIAZ UR-MCNC: POSITIVE
CHOLEST SERPL-MCNC: 218 MG/DL — HIGH
COCAINE METAB.OTHER UR-MCNC: POSITIVE
HDLC SERPL-MCNC: 56 MG/DL — SIGNIFICANT CHANGE UP
LIPID PNL WITH DIRECT LDL SERPL: 148 MG/DL — HIGH
METHADONE UR-MCNC: NEGATIVE — SIGNIFICANT CHANGE UP
NON HDL CHOLESTEROL: 162 MG/DL — HIGH
OPIATES UR-MCNC: NEGATIVE — SIGNIFICANT CHANGE UP
PCP SPEC-MCNC: SIGNIFICANT CHANGE UP
PROPOXYPHENE QUALITATIVE URINE RESULT: NEGATIVE — SIGNIFICANT CHANGE UP
TRIGL SERPL-MCNC: 68 MG/DL — SIGNIFICANT CHANGE UP

## 2022-10-06 PROCEDURE — 99231 SBSQ HOSP IP/OBS SF/LOW 25: CPT

## 2022-10-06 RX ADMIN — Medication 50 MILLIGRAM(S): at 20:38

## 2022-10-06 RX ADMIN — Medication 1 TABLET(S): at 10:09

## 2022-10-06 RX ADMIN — ESCITALOPRAM OXALATE 10 MILLIGRAM(S): 10 TABLET, FILM COATED ORAL at 10:09

## 2022-10-06 NOTE — PROGRESS NOTE BEHAVIORAL HEALTH - NSBHFUPINTERVALHXFT_PSY_A_CORE
Patient seen and evaluated. As per nursing report no acute events. Patient verbalizes feeling better. States she slept well last night. Denies A/V hallucinations. No evidence of psychosis noted. Denies any suicidal/homicidal ideations. Patient encouraged to get out of her room engage with peers and attend groups.
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
## 2022-10-07 PROCEDURE — 99231 SBSQ HOSP IP/OBS SF/LOW 25: CPT

## 2022-10-07 RX ADMIN — ESCITALOPRAM OXALATE 10 MILLIGRAM(S): 10 TABLET, FILM COATED ORAL at 08:16

## 2022-10-07 RX ADMIN — Medication 1 TABLET(S): at 08:16
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
## 2022-10-07 NOTE — PROGRESS NOTE BEHAVIORAL HEALTH - NSBHFUPINTERVALHXFT_PSY_A_CORE
Patient seen and evaluated. As per nursing report PRN for sleep with good effect. Patient verbalizes feeling better. States she is sleeping well and appetite is good. Denies A/V hallucinations. No evidence of psychosis noted. Denies any suicidal/homicidal ideations. Team discussed coping skills to deal with psychosocial stressor. Patient encouraged to get out of her room engage with peers and attend groups. Patient seen and evaluated. As per nursing report PRN for sleep with good effect. Patient verbalizes feeling better. States she is sleeping well and appetite is good. Denies A/V hallucinations. No evidence of psychosis noted. Denies any suicidal/homicidal ideations. Team discussed coping skills to deal with psychosocial stressors. Patient encouraged to get out of her room engage with peers and attend groups.

## 2022-10-08 DIAGNOSIS — F60.3 BORDERLINE PERSONALITY DISORDER: ICD-10-CM

## 2022-10-08 PROCEDURE — 99231 SBSQ HOSP IP/OBS SF/LOW 25: CPT

## 2022-10-08 RX ADMIN — ESCITALOPRAM OXALATE 10 MILLIGRAM(S): 10 TABLET, FILM COATED ORAL at 08:03

## 2022-10-08 RX ADMIN — Medication 1 TABLET(S): at 08:03

## 2022-10-08 RX ADMIN — Medication 50 MILLIGRAM(S): at 20:12
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
## 2022-10-08 NOTE — PROGRESS NOTE BEHAVIORAL HEALTH - NSBHFUPINTERVALHXFT_PSY_A_CORE
Patient seen and evaluated. As per nursing report Alka came to a group last night, is "up and about", taking her medications. She was evaluated in her room, presented friendly, well related with a lot of smiling. Multiple tattooes noticed. She reports improved mood, and denies SI, stating that she never was suicidal and her OD on Zoloft was an attempt to get away from the situation at home. She admits that "this was a stupid thing to do" and she is "really impulsive", especially before periods. She admits h/o NSSIB (cutting), problems with separation and experimentation with drugs. No psychotic SX.

## 2022-10-09 DIAGNOSIS — F32.9 MAJOR DEPRESSIVE DISORDER, SINGLE EPISODE, UNSPECIFIED: ICD-10-CM

## 2022-10-09 RX ADMIN — Medication 50 MILLIGRAM(S): at 20:44

## 2022-10-09 RX ADMIN — ESCITALOPRAM OXALATE 10 MILLIGRAM(S): 10 TABLET, FILM COATED ORAL at 08:03

## 2022-10-09 RX ADMIN — Medication 1 TABLET(S): at 08:03
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
## 2022-10-09 NOTE — PROGRESS NOTE BEHAVIORAL HEALTH - NSBHFUPINTERVALHXFT_PSY_A_CORE
Chart reviewed. Patient received Atarax 50 mg PRN on 10/8 at 2012. Patient seen and evaluated at bedside.    She was evaluated in her room, presented friendly, well related with a lot of smiling. Multiple tattoos noticed. She reported sleeping well. She reports improved mood, and denies SI, stating that she never was suicidal and her OD with 6 pills of Zoloft was an attempt to get away from the situation at home and with her boyfriend. Patient reported the Lexapro has been working well with no reported side effects. She had requested the Lexapro on admission. She prefers it to her previous medication Zoloft which she reported kept her up at night. Patient denied any current SI.
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
## 2022-10-10 PROCEDURE — 99231 SBSQ HOSP IP/OBS SF/LOW 25: CPT

## 2022-10-10 RX ADMIN — Medication 1 TABLET(S): at 08:23

## 2022-10-10 RX ADMIN — ESCITALOPRAM OXALATE 10 MILLIGRAM(S): 10 TABLET, FILM COATED ORAL at 08:23

## 2022-10-10 RX ADMIN — Medication 50 MILLIGRAM(S): at 21:40

## 2022-10-10 NOTE — DISCHARGE NOTE BEHAVIORAL HEALTH - NSBHDCDXVALIDYESFT_PSY_A_CORE
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 Secondary Dx 1 Generalized anxiety disorder F41.1. Secondary Dx 2 Polysubstance abuse F19.10.

## 2022-10-10 NOTE — DISCHARGE NOTE BEHAVIORAL HEALTH - MEDICATION SUMMARY - MEDICATIONS TO TAKE
I will START or STAY ON the medications listed below when I get home from the hospital:    escitalopram 10 mg oral tablet  -- 1 tab(s) by mouth once a day x 14 days. Continue until told otherwise by your provider.   -- Indication: For Depression/anxiety    Multiple Vitamins oral tablet  -- 1 tab(s) by mouth once a day. Continue until told otherwise by your provider.  -- Indication: For SUpplement

## 2022-10-10 NOTE — DISCHARGE NOTE BEHAVIORAL HEALTH - NSBHDCRESPONSEFT_PSY_A_CORE
Patient reports feeling better, presenting with good insight and judgment. Patient denied any suicidal or homicidal ideations. Patient denied any auditory or visual hallucinations. Patient is future oriented, optimistic and motivated to participate in Outpatient treatment. Patient is stable for discharge and shows no imminent danger to self, others at this time. Patient understands and verbalized agreement with treatment plan and following up with outpatient. Psychoeducation provided regarding diagnosis, medications, treatment and follow up. Risks, benefits, alternatives discussed, all questions and concerns addressed and answered.

## 2022-10-10 NOTE — PROGRESS NOTE BEHAVIORAL HEALTH - NSBHFUPINTERVALHXFT_PSY_A_CORE
Patient seen and evaluated in treatment team. As per nursing report PRN for sleep with good effect. Patient verbalizes feeling better. States she is sleeping well and appetite is good. Denies A/V hallucinations. No evidence of psychosis noted. Denies any suicidal/homicidal ideations. Discussed coping skills. Patient talked about going to individual therapy and couples therapy with her boyfriend. Patient visible on the unit engaging with peers and attending groups. Anticipate discharge later this week provided patient continues to improve. Patient seen and evaluated in treatment team. As per nursing report PRN for sleep with good effect. Patient verbalizes feeling better. States she is sleeping well and appetite is good. Denies A/V hallucinations. No evidence of psychosis noted. Denies any suicidal/homicidal ideations. Discussed coping skills. Patient talked about going to individual therapy and couples therapy with her boyfriend. Patient visible on the unit engaging with peers and attending groups. Anticipate discharge later this week provided patient continues to improve.    Met with patients boyfrienalex Newell in visiting with patient. Discussed patients care and progress and anticipated discharge at end of week. Boyfriend has no concerns with discharge.

## 2022-10-10 NOTE — CHART NOTE - NSCHARTNOTEFT_GEN_A_CORE
Social Work Note:       Alka remains on the unit for continued treatment, safety, and observation.  Patient attended treatment team meeting today.  She was calm, cooperative, and pleasant.  She said the weekend was long and boring, otherwise she said she's doing well.  She endorsed good sleep and appetite.  Patient denies SI/HI and A/V/H.  She has been medication compliant and denies side effects.  She has been visible on the unit, interacting with peers, watching TV, and attending groups.  She has been in good behavioral control.      Treatment team discussed discharge plan with patient.  She reports she plans to return home to her boyfriend at discharge.  She said she hopes they can attend couples therapy together to work on their issues.  Patient also expressed insight into the importance of following up with individual psychotherapy post discharge.  She is agreeable to referral to [facility].      SW will continue to meet with patient for education, support, referrals, and assistance in developing a safe discharge plan.      Mental Status Exam:      Mood – Euthymic   Sleep  - Fair  Appetite – Good  ADLs – Good  Thought Process –Linear    Observation – q10 minutes.

## 2022-10-10 NOTE — DISCHARGE NOTE BEHAVIORAL HEALTH - NS MD DC FALL RISK RISK
For information on Fall & Injury Prevention, visit: [email or web address] OR  [email or web address] OR  https://www.cdc.gov/steadi/patient.html

## 2022-10-10 NOTE — DISCHARGE NOTE BEHAVIORAL HEALTH - MEDICATION SUMMARY - MEDICATIONS TO STOP TAKING
I will STOP taking the medications listed below when I get home from the hospital:    sertraline 50 mg oral tablet  -- 1 tab(s) by mouth once a day x 25 days. Continue as prescribed until told otherwise by yourprovider.

## 2022-10-10 NOTE — DISCHARGE NOTE BEHAVIORAL HEALTH - NSBHDCHOUSING_PSY_A_CORE
home [address] [place] [address], [address], [facility], [id number]home

## 2022-10-10 NOTE — DISCHARGE NOTE BEHAVIORAL HEALTH - NSBHDCSWCOMMENTSFT_PSY_A_CORE
Discharge summary sent to [email or web address] on 10/13 at 10am
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
## 2022-10-11 LAB
BENZOYLEGONINE, UR RESULT: 273 NG/ML — SIGNIFICANT CHANGE UP
BZE UR QL SCN: 273 NG/ML — SIGNIFICANT CHANGE UP
COCAINE IN-HOUSE INTERPRETATION: POSITIVE
COCAINE UR QL SCN: POSITIVE

## 2022-10-11 PROCEDURE — 99231 SBSQ HOSP IP/OBS SF/LOW 25: CPT

## 2022-10-11 RX ADMIN — Medication 1 TABLET(S): at 08:04

## 2022-10-11 RX ADMIN — ESCITALOPRAM OXALATE 10 MILLIGRAM(S): 10 TABLET, FILM COATED ORAL at 08:04

## 2022-10-11 RX ADMIN — Medication 50 MILLIGRAM(S): at 21:01

## 2022-10-11 NOTE — PROGRESS NOTE BEHAVIORAL HEALTH - NSBHFUPINTERVALHXFT_PSY_A_CORE
Patient seen and evaluated. As per nursing report PRN for sleep with good effect. Patient verbalizes feeling good. States she is sleeping well and appetite is good. Denies A/V hallucinations. No evidence of psychosis noted. Denies any suicidal/homicidal ideations. Patient visible on the unit engaging with peers and attending groups. Anticipate discharge later this week provided patient continues to improve.
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
## 2022-10-12 PROCEDURE — 99231 SBSQ HOSP IP/OBS SF/LOW 25: CPT

## 2022-10-12 RX ORDER — ESCITALOPRAM OXALATE 10 MG/1
1 TABLET, FILM COATED ORAL
Qty: 14 | Refills: 0
Start: 2022-10-12 | End: 2022-10-25

## 2022-10-12 RX ADMIN — Medication 50 MILLIGRAM(S): at 21:00

## 2022-10-12 RX ADMIN — Medication 1 TABLET(S): at 08:45

## 2022-10-12 RX ADMIN — ESCITALOPRAM OXALATE 10 MILLIGRAM(S): 10 TABLET, FILM COATED ORAL at 08:45

## 2022-10-12 NOTE — PROGRESS NOTE BEHAVIORAL HEALTH - NSBHATTESTTYPEVISIT_PSY_A_CORE
JANETH without on-site Attending supervision
JANETH without on-site Attending supervision
Attending Only
JANETH without on-site Attending supervision
Attending with Resident/Fellow/Student
JANETH without on-site Attending supervision
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
## 2022-10-12 NOTE — PROGRESS NOTE BEHAVIORAL HEALTH - NSBHCHARTREVIEWVS_PSY_A_CORE FT
Vital Signs Last 24 Hrs  T(C): 36.7 (10 Oct 2022 09:25), Max: 36.8 (09 Oct 2022 15:41)  T(F): 98 (10 Oct 2022 09:25), Max: 98.3 (09 Oct 2022 15:41)  HR: 97 (10 Oct 2022 09:25) (73 - 97)  BP: 120/78 (10 Oct 2022 09:25) (103/59 - 120/78)  BP(mean): --  RR: 18 (10 Oct 2022 09:25) (15 - 18)  SpO2: --
Vital Signs Last 24 Hrs  T(C): 36.1 (07 Oct 2022 08:22), Max: 36.5 (06 Oct 2022 15:30)  T(F): 96.9 (07 Oct 2022 08:22), Max: 97.7 (06 Oct 2022 15:30)  HR: 80 (07 Oct 2022 08:22) (73 - 80)  BP: 117/76 (07 Oct 2022 08:22) (109/58 - 117/76)  BP(mean): --  RR: 18 (07 Oct 2022 08:22) (16 - 18)  SpO2: --
Vital Signs Last 24 Hrs  T(C): 36.5 (12 Oct 2022 08:03), Max: 36.5 (12 Oct 2022 08:03)  T(F): 97.7 (12 Oct 2022 08:03), Max: 97.7 (12 Oct 2022 08:03)  HR: 93 (12 Oct 2022 08:03) (72 - 93)  BP: 120/56 (12 Oct 2022 08:03) (116/56 - 120/56)  BP(mean): --  RR: 18 (12 Oct 2022 08:03) (18 - 18)  SpO2: --
Vital Signs Last 24 Hrs  T(C): 36.6 (11 Oct 2022 08:05), Max: 36.7 (10 Oct 2022 15:44)  T(F): 97.9 (11 Oct 2022 08:05), Max: 98.1 (10 Oct 2022 15:44)  HR: 78 (11 Oct 2022 08:05) (78 - 101)  BP: 121/78 (11 Oct 2022 08:05) (119/63 - 121/78)  BP(mean): --  RR: 18 (11 Oct 2022 08:05) (16 - 18)  SpO2: --
Vital Signs Last 24 Hrs  T(C): 35.7 (06 Oct 2022 07:57), Max: 36.1 (05 Oct 2022 15:42)  T(F): 96.3 (06 Oct 2022 07:57), Max: 96.9 (05 Oct 2022 15:42)  HR: 83 (06 Oct 2022 07:57) (75 - 83)  BP: 121/81 (06 Oct 2022 07:57) (102/61 - 121/81)  BP(mean): --  RR: 16 (06 Oct 2022 07:57) (16 - 16)  SpO2: --
Vital Signs Last 24 Hrs  T(C): 36.4 (09 Oct 2022 07:41), Max: 36.6 (08 Oct 2022 18:21)  T(F): 97.5 (09 Oct 2022 07:41), Max: 97.9 (08 Oct 2022 18:21)  HR: 91 (09 Oct 2022 07:41) (82 - 91)  BP: 127/81 (09 Oct 2022 07:41) (112/65 - 127/81)  BP(mean): --  RR: 18 (09 Oct 2022 07:41) (18 - 18)  SpO2: --
Vital Signs Last 24 Hrs  T(C): 36.2 (05 Oct 2022 08:06), Max: 36.9 (04 Oct 2022 15:13)  T(F): 97.2 (05 Oct 2022 08:06), Max: 98.4 (04 Oct 2022 15:13)  HR: 75 (05 Oct 2022 08:06) (74 - 81)  BP: 128/87 (05 Oct 2022 08:06) (103/68 - 128/87)  BP(mean): --  RR: 16 (05 Oct 2022 08:06) (16 - 18)  SpO2: 100% (04 Oct 2022 23:15) (100% - 100%)    Parameters below as of 04 Oct 2022 23:15  Patient On (Oxygen Delivery Method): room air
Vital Signs Last 24 Hrs  T(C): 36.1 (07 Oct 2022 08:22), Max: 36.5 (06 Oct 2022 15:30)  T(F): 96.9 (07 Oct 2022 08:22), Max: 97.7 (06 Oct 2022 15:30)  HR: 80 (07 Oct 2022 08:22) (73 - 80)  BP: 117/76 (07 Oct 2022 08:22) (109/58 - 117/76)  BP(mean): --  RR: 18 (07 Oct 2022 08:22) (16 - 18)  SpO2: --

## 2022-10-12 NOTE — PROGRESS NOTE BEHAVIORAL HEALTH - AXIS III
HTN, gastritis, duodenitis and vitamin D deficiency

## 2022-10-12 NOTE — PROGRESS NOTE BEHAVIORAL HEALTH - NSBHCHARTREVIEWINVESTIGATE_PSY_A_CORE FT
< from: 12 Lead ECG (10.02.22 @ 16:59) >    Ventricular Rate 78 BPM    Atrial Rate 78 BPM    P-R Interval 112 ms    QRS Duration 88 ms    Q-T Interval 368 ms    QTC Calculation(Bazett) 419 ms    P Axis 70 degrees    R Axis 84 degrees    T Axis 45 degrees    Diagnosis Line Normal sinus rhythm  Normal ECG    < end of copied text >

## 2022-10-12 NOTE — PROGRESS NOTE BEHAVIORAL HEALTH - NSBHFUPINTERVALCCFT_PSY_A_CORE
"I'm feeling better"
"I'm feeling better"
"I feel good"
"I feel better."
"I feel good"
"I'm feeling better"
"I am good, I am just bored here"
"I had a fight with my boyfriend"

## 2022-10-12 NOTE — PROGRESS NOTE BEHAVIORAL HEALTH - NSBHPTASSESSDT_PSY_A_CORE
05-Oct-2022 13:01
07-Oct-2022 13:01
10-Oct-2022 12:15
12-Oct-2022 10:24
06-Oct-2022 12:08
08-Oct-2022 11:57
09-Oct-2022 12:32
11-Oct-2022 13:05

## 2022-10-12 NOTE — PROGRESS NOTE BEHAVIORAL HEALTH - SECONDARY DX1
Generalized anxiety disorder

## 2022-10-12 NOTE — CHART NOTE - NSCHARTNOTEFT_GEN_A_CORE
Social Work Note:       Alka remains on the unit for continued treatment, safety, and observation.  Treatment team met with patient on morning rounds.  She was calm, cooperative, and pleasant.  Patient was smiling and appeared to be in good spirits.  She said she feels good and is looking forward to discharge.  Patient denies SI/HI and A/V/H.  She has been medication compliant and denies side effects.  She has been visible on the unit, interacting with peers, watching TV, and attending groups.      Anticipated discharge for tomorrow, Thursday 10/13.  Patient [place] [address].  She will resume mental health treatment at [facility].      Mental Status Exam:      Mood – Euthymic   Sleep  - Good  Appetite – Good  ADLs – Good  Thought Process –Linear    Observation – q10 minutes.

## 2022-10-12 NOTE — CHART NOTE - NSCHARTNOTEFT_GEN_A_CORE
INTERVAL DATA:     Interval Chief Complaint: "I feel good"  Interval History: Patient seen and evaluated the morning. As per nursing report there were no acute events overnight. Patient is seen socializing with peers on the unit. On approach, patient is calm and smiling stating that she is feeling good. There was no agitation, aggression, or psychosis noted. States she is sleeping well and appetite is good. Mood is continuing to improve. Denies suicidal ideation and A/V hallucinations. She remains compliant with her medication. Anticipated discharge tomorrow provided patient shows improvement and has no concerns.     MENTAL STATUS EXAM:   · Level of Consciousness	Alert  · General Appearance	No deformities present  · Body Habitus	Average build  · Hygiene	              Good  · Grooming	Good  · Behavior	Cooperative  · Eye Contact	Good  · Relatedness	Good  · Impulse Control	Normal  · Muscle Tone / Strength	Normal muscle tone/strength  · Abnormal Movements	No abnormal movements  · Gait / Station	Normal gait / station  · Speech Volume	Normal  · Speech Rate	Normal  · Speech Spontaneity	Normal  · Speech Articulation	Normal  · Reported mood	Normal  · Affect Quality	Euthymic  · Affect Range	Full  · Affect Congruence	Congruent  · Thought Process	Linear  · Thought Associations	Normal  · Thought Content	Unremarkable  · Perceptions	No abnormalities  · Oriented to Time	Yes  · Oriented to Place	Yes  · Oriented to Situation	Yes  · Oriented to Person	Yes  · Attention / Concentration	Normal  · Estimated Intelligence	Average  · Recent Memory	Normal  · Remote Memory	Normal  · Fund of Knowledge	Normal  · Language	No abnormalities noted  · Judgment (regarding everyday events)	Fair  · Insight (regarding psychiatric illness)	Fair    DIAGNOSIS DSM-V:    Psychiatric Diagnosis (Corresponds to DSM-IV Axis I, II):  Primary Dx Severe episode of recurrent major depressive disorder, 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 Secondary Dx 1 Generalized anxiety disorder F41.1. Secondary Dx 2 Polysubstance abuse F19.10.    Plan  -Lexapro 10mg daily for depression   -Hydroxyzine 50mg Q6 PRN for anxiety/insomnia  -Haldol 5mg Q6 PRN for agitation/psychosis  -Benadryl 50mg Q6 PRN got EPS  -Lorazepam 2mg Q6 PRN for aggression/ objective s/s of withdrawal

## 2022-10-12 NOTE — PROGRESS NOTE BEHAVIORAL HEALTH - THOUGHT CONTENT
Suicidality/Other
Unremarkable

## 2022-10-12 NOTE — PROGRESS NOTE BEHAVIORAL HEALTH - NSBHCHARTREVIEWLAB_PSY_A_CORE FT
Complete Blood Count + Automated Diff (10.05.22 @ 07:48)   WBC Count: 3.88 K/uL   RBC Count: 5.01 M/uL   Hemoglobin: 14.4 g/dL   Hematocrit: 43.3    Mean Cell Volume: 86.4 fL   Mean Cell Hemoglobin: 28.7 pg   Mean Cell Hemoglobin Conc: 33.3 g/dL   Red Cell Distrib Width: 12.1   Platelet Count - Automated: 267 K/uL   Auto Neutrophil #: 1.30 K/uL   Auto Lymphocyte #: 2.15 K/uL   Auto Monocyte #: 0.31 K/uL   Auto Eosinophil #: 0.09 K/uL   Auto Basophil #: 0.02 K/uL   Auto Neutrophil %: 33.5:   Auto Lymphocyte %: 55.4    Auto Monocyte %: 8.0   Auto Eosinophil %: 2.3   Auto Basophil %: 0.5   Auto Immature Granulocyte %: 0.3: (Includes meta, myelo and promyelocytes). Mild elevations in immature   granulocytes may be seen with many inflammatory processes and pregnancy;   clinical correlation suggested.   Nucleated RBC: 0 /100 WBCs

## 2022-10-12 NOTE — CHART NOTE - NSCHARTNOTEFT_GEN_A_CORE
Spoke with Patients Boyfrienalex Newell ([phone]) to inform him that the patient is scheduled for discharge tomorrow. Patients boyfriend will be picking her up at 11 am tomorrow and expresses no concerns for discharge.

## 2022-10-12 NOTE — PROGRESS NOTE BEHAVIORAL HEALTH - NSBHATTESTBILLING_PSY_A_CORE
[id number]-Dymfmbnnga Inpatient care - low complexity - 15 minutes
50150-Zrjctfqysg Inpatient care - low complexity - 15 minutes
89613-Xoplbeooqb Inpatient care - low complexity - 15 minutes
[id number]-Aowuihduuu Inpatient care - low complexity - 15 minutes
[id number]-Obkxcumkdo Inpatient care - low complexity - 15 minutes
[id number]-Gnzrmzazro Inpatient care - low complexity - 15 minutes
24036-Zckcoajasq Inpatient care - moderate complexity - 25 minutes
Billing in another system

## 2022-10-12 NOTE — PROGRESS NOTE BEHAVIORAL HEALTH - RISK ASSESSMENT
Pertinent known risk and protective factors are noted in documentation above

## 2022-10-13 VITALS
HEART RATE: 89 BPM | DIASTOLIC BLOOD PRESSURE: 69 MMHG | TEMPERATURE: 97 F | RESPIRATION RATE: 18 BRPM | SYSTOLIC BLOOD PRESSURE: 123 MMHG

## 2022-10-13 PROCEDURE — 99238 HOSP IP/OBS DSCHRG MGMT 30/<: CPT

## 2022-10-13 RX ADMIN — Medication 1 TABLET(S): at 08:14

## 2022-10-13 RX ADMIN — ESCITALOPRAM OXALATE 10 MILLIGRAM(S): 10 TABLET, FILM COATED ORAL at 08:14

## 2022-10-13 NOTE — CHART NOTE - NSCHARTNOTEFT_GEN_A_CORE
Social Work Discharge Note    Patient is for discharge today.  She is alert and oriented x3.  Mood has improved.  Anxiety has decreased.  Insight and judgment have improved.  Suicidal/homicidal ideation denied.    Patient will be discharged to her home in [place].  She has been referred to [facility] for continued treatment in the community.      [facility] [phone] provided as an additional resource.    Patient confirms her cell phone # is [phone]    Patient is aware and agreeable to discharge today. Social Work Discharge Note    Patient is for discharge today.  She is alert and oriented x3.  Mood has improved.  Anxiety has decreased.  Insight and judgment have improved.  Suicidal/homicidal ideation denied.    Patient will be [place] [address].     She has been referred to [facility] Department for continued treatment in the community.      [facility] [phone] provided as an additional resource.    Patient confirms her cell phone # is [phone]    Patient is aware and agreeable to discharge today.

## 2022-10-13 NOTE — CHART NOTE - NSCHARTNOTESELECT_GEN_ALL_CORE
Event Note
Social Work Note/Event Note

## 2022-10-13 NOTE — CHART NOTE - NSCHARTNOTEFT_GEN_A_CORE
D/C today. Patients boyfriend picked patient up. Meds sent to [facility] pharmacy as requested by patient. Patient endorses a better mood. Insight and judgment have improved. Denies suicidal/ homicidal ideations. Patient able to contract for safety, stating the importance of compliance with medication and treatment. Compliant with medication. Does not warrant continued Inpatient hospitalization. Writer, PA student reviewed D/C papers with patient. Patient verbalized understanding. Patient does not appear to be of imminent danger to self or others at this time.    · Residence	home  [address] [place] [address], [facility] [address] [facility], [facility], [address].     Aftercare Appointments:  · Agency Name	[facility]  · Appointment Date/Time	18-Oct-2022 12:30  · Address	[address] [place] [address]  · Phone #	[phone]  · Purpose	Intake Appointment with Dorys **IN PERSON**    · Agency Name	[facility]  · Appointment Date/Time	24-Oct-2022 10:00  · Address	[address] [place] [address]  · Phone #	[phone]  · Purpose	Psychiatry Appointment with Dr Ho **IN PERSON**

## 2022-10-14 NOTE — PROGRESS NOTE BEHAVIORAL HEALTH - FUND OF KNOWLEDGE
Normal
Cheek-To-Nose Interpolation Flap Text: A decision was made to reconstruct the defect utilizing an interpolation axial flap and a staged reconstruction.  A telfa template was made of the defect.  This telfa template was then used to outline the Cheek-To-Nose Interpolation flap.  The donor area for the pedicle flap was then injected with anesthesia.  The flap was excised through the skin and subcutaneous tissue down to the layer of the underlying musculature.  The interpolation flap was carefully excised within this deep plane to maintain its blood supply.  The edges of the donor site were undermined.   The donor site was closed in a primary fashion.  The pedicle was then rotated into position and sutured.  Once the tube was sutured into place, adequate blood supply was confirmed with blanching and refill.  The pedicle was then wrapped with xeroform gauze and dressed appropriately with a telfa and gauze bandage to ensure continued blood supply and protect the attached pedicle.

## 2022-10-17 DIAGNOSIS — Z79.899 OTHER LONG TERM (CURRENT) DRUG THERAPY: ICD-10-CM

## 2022-10-17 DIAGNOSIS — F19.10 OTHER PSYCHOACTIVE SUBSTANCE ABUSE, UNCOMPLICATED: ICD-10-CM

## 2022-10-17 DIAGNOSIS — Z20.822 CONTACT WITH AND (SUSPECTED) EXPOSURE TO COVID-19: ICD-10-CM

## 2022-10-17 DIAGNOSIS — K29.70 GASTRITIS, UNSPECIFIED, WITHOUT BLEEDING: ICD-10-CM

## 2022-10-17 DIAGNOSIS — F41.1 GENERALIZED ANXIETY DISORDER: ICD-10-CM

## 2022-10-17 DIAGNOSIS — F33.2 MAJOR DEPRESSIVE DISORDER, RECURRENT SEVERE WITHOUT PSYCHOTIC FEATURES: ICD-10-CM

## 2022-10-17 DIAGNOSIS — R45.851 SUICIDAL IDEATIONS: ICD-10-CM

## 2022-10-17 DIAGNOSIS — E55.9 VITAMIN D DEFICIENCY, UNSPECIFIED: ICD-10-CM

## 2022-10-17 DIAGNOSIS — T43.222A POISONING BY SELECTIVE SEROTONIN REUPTAKE INHIBITORS, INTENTIONAL SELF-HARM, INITIAL ENCOUNTER: ICD-10-CM

## 2022-10-17 DIAGNOSIS — I10 ESSENTIAL (PRIMARY) HYPERTENSION: ICD-10-CM

## 2022-10-18 ENCOUNTER — APPOINTMENT (OUTPATIENT)
Dept: PSYCHIATRY | Facility: CLINIC | Age: 32
End: 2022-10-18

## 2022-10-18 ENCOUNTER — NON-APPOINTMENT (OUTPATIENT)
Age: 32
End: 2022-10-18

## 2022-10-24 ENCOUNTER — OUTPATIENT (OUTPATIENT)
Dept: OUTPATIENT SERVICES | Facility: HOSPITAL | Age: 32
LOS: 1 days | Discharge: HOME | End: 2022-10-24

## 2022-10-24 ENCOUNTER — APPOINTMENT (OUTPATIENT)
Dept: PSYCHIATRY | Facility: CLINIC | Age: 32
End: 2022-10-24

## 2022-10-24 DIAGNOSIS — F41.9 ANXIETY DISORDER, UNSPECIFIED: ICD-10-CM

## 2022-10-24 DIAGNOSIS — F60.9 PERSONALITY DISORDER, UNSPECIFIED: ICD-10-CM

## 2022-10-24 DIAGNOSIS — F33.1 MAJOR DEPRESSIVE DISORDER, RECURRENT, MODERATE: ICD-10-CM

## 2022-10-24 PROCEDURE — 90792 PSYCH DIAG EVAL W/MED SRVCS: CPT

## 2022-10-24 RX ORDER — SERTRALINE HYDROCHLORIDE 50 MG/1
50 TABLET, FILM COATED ORAL
Qty: 25 | Refills: 0 | Status: DISCONTINUED | COMMUNITY
Start: 2022-09-02

## 2022-10-24 RX ORDER — ESCITALOPRAM OXALATE 5 MG/1
5 TABLET ORAL DAILY
Qty: 30 | Refills: 1 | Status: ACTIVE | COMMUNITY
Start: 2022-10-24 | End: 1900-01-01

## 2022-10-24 RX ORDER — ESCITALOPRAM OXALATE 10 MG/1
10 TABLET ORAL DAILY
Qty: 30 | Refills: 1 | Status: ACTIVE | COMMUNITY
Start: 2022-10-12 | End: 1900-01-01
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
## 2022-10-24 NOTE — PLAN
[Admit to Program     (Add Program Admission information to a new column in the Admit/Discharge Flowsheet)] : Admit to program

## 2022-10-24 NOTE — CURRENT PSYCHIATRIC SYMPTOMS
[Depressed Mood] : depressed mood [Excessive Worry] : excessive worry [Panic] : panic  [de-identified] : improved [de-identified] : mild
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
## 2022-11-04 ENCOUNTER — APPOINTMENT (OUTPATIENT)
Dept: PSYCHIATRY | Facility: CLINIC | Age: 32
End: 2022-11-04

## 2022-11-15 NOTE — ED BEHAVIORAL HEALTH ASSESSMENT NOTE - CURRENT INTENT:
Detail Level: Detailed Depth Of Biopsy: dermis Was A Bandage Applied: Yes Size Of Lesion In Cm: 0 Biopsy Type: H and E Biopsy Method: 15 blade Anesthesia Type: 1% lidocaine with 1:100,000 epinephrine and a 1:3 solution of 8.4% sodium bicarbonate Anesthesia Volume In Cc (Will Not Render If 0): 0.5 Hemostasis: Aluminum Chloride Wound Care: Vaseline Dressing: bandage Destruction After The Procedure: No Type Of Destruction Used: Curettage Curettage Text: The wound bed was treated with curettage after the biopsy was performed. Cryotherapy Text: The wound bed was treated with cryotherapy after the biopsy was performed. Electrodesiccation Text: The wound bed was treated with electrodesiccation after the biopsy was performed. Electrodesiccation And Curettage Text: The wound bed was treated with electrodesiccation and curettage after the biopsy was performed. Silver Nitrate Text: The wound bed was treated with silver nitrate after the biopsy was performed. Lab: -99 Lab Facility: 3 Consent: Written consent was obtained and risks were reviewed including but not limited to scarring, infection, bleeding, scabbing, incomplete removal, nerve damage and allergy to anesthesia. Post-Care Instructions: I reviewed with the patient in detail post-care instructions. Patient is to keep the biopsy site dry overnight, and then apply bacitracin twice daily until healed. Patient may apply hydrogen peroxide soaks to remove any crusting. Notification Instructions: Patient will be notified of biopsy results. However, patient instructed to call the office if not contacted within 2 weeks. Billing Type: Third-Party Bill Information: Selecting Yes will display possible errors in your note based on the variables you have selected. This validation is only offered as a suggestion for you. PLEASE NOTE THAT THE VALIDATION TEXT WILL BE REMOVED WHEN YOU FINALIZE YOUR NOTE. IF YOU WANT TO FAX A PRELIMINARY NOTE YOU WILL NEED TO TOGGLE THIS TO 'NO' IF YOU DO NOT WANT IT IN YOUR FAXED NOTE. Unable to assess

## 2022-11-16 ENCOUNTER — APPOINTMENT (OUTPATIENT)
Dept: PSYCHIATRY | Facility: CLINIC | Age: 32
End: 2022-11-16

## 2022-11-21 ENCOUNTER — APPOINTMENT (OUTPATIENT)
Dept: PSYCHIATRY | Facility: CLINIC | Age: 32
End: 2022-11-21

## 2022-11-22 ENCOUNTER — NON-APPOINTMENT (OUTPATIENT)
Age: 32
End: 2022-11-22
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
## 2022-12-08 ENCOUNTER — OUTPATIENT (OUTPATIENT)
Dept: OUTPATIENT SERVICES | Facility: HOSPITAL | Age: 32
LOS: 1 days | Discharge: HOME | End: 2022-12-08

## 2022-12-08 ENCOUNTER — APPOINTMENT (OUTPATIENT)
Dept: PSYCHIATRY | Facility: CLINIC | Age: 32
End: 2022-12-08

## 2022-12-08 ENCOUNTER — NON-APPOINTMENT (OUTPATIENT)
Age: 32
End: 2022-12-08

## 2022-12-08 DIAGNOSIS — F41.9 ANXIETY DISORDER, UNSPECIFIED: ICD-10-CM

## 2022-12-08 DIAGNOSIS — F33.1 MAJOR DEPRESSIVE DISORDER, RECURRENT, MODERATE: ICD-10-CM

## 2022-12-08 DIAGNOSIS — F60.9 PERSONALITY DISORDER, UNSPECIFIED: ICD-10-CM

## 2022-12-21 ENCOUNTER — APPOINTMENT (OUTPATIENT)
Dept: PSYCHIATRY | Facility: CLINIC | Age: 32
End: 2022-12-21

## 2022-12-21 ENCOUNTER — OUTPATIENT (OUTPATIENT)
Dept: OUTPATIENT SERVICES | Facility: HOSPITAL | Age: 32
LOS: 1 days | Discharge: HOME | End: 2022-12-21

## 2022-12-21 DIAGNOSIS — F33.1 MAJOR DEPRESSIVE DISORDER, RECURRENT, MODERATE: ICD-10-CM

## 2022-12-21 DIAGNOSIS — F41.9 ANXIETY DISORDER, UNSPECIFIED: ICD-10-CM

## 2022-12-21 DIAGNOSIS — F60.9 PERSONALITY DISORDER, UNSPECIFIED: ICD-10-CM
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
## 2022-12-21 NOTE — PHYSICAL EXAM
[Cooperative] : cooperative [Anxious] : anxious [Full] : full [Clear] : clear [Linear/Goal Directed] : linear/goal directed [Average] : average [WNL] : within normal limits

## 2022-12-21 NOTE — DISCUSSION/SUMMARY
[Initial Plan] : Initial Plan [Able to exercise self-direction] : able to exercise self-direction [Articulate] : articulate [Cognitively intact] : cognitively intact [Insightful] : insightful [Intelligent] : intelligent [Motivated and ready for change] : motivated and ready for change [In good health] : in good health [Financially stable] : financially stable [Has vocational interests or hobbies] : has vocational interests or hobbies [English fluency] : English fluency [Access to safe outdoor spaces] : access to safe outdoor spaces [Social supports] : social supports [Mental Health] : Mental Health [Social/Leisure] : Social/Leisure [Interpersonal Relationships] : Interpersonal Relationships [Continued - Progress made] : Continued - Progress made: [Initial] : Initial [every ___ weeks] : every [unfilled] weeks [Improvement in symptoms as evidenced by:] : Improvement in symptoms as evidenced by: [None - Reason others did not participate:] : None - Reason others did not participate:  [Yes] : Yes [Psychiatric Provider/Prescriber] : Psychiatric Provider/Prescriber [Therapist] : Therapist [FreeTextEntry1] : 12/21/22- Pt was not seen when tx plan was due [FreeTextEntry2] : 12/21/23 [FreeTextEntry3] : 10/24/22 [FreeTextEntry8] : Pt has a hx of inconsistent attendance [de-identified] : [facility] TE[facility] [FreeTextEntry4] : " I want a healthier life for me and my son" [de-identified] : Objective 1- Continued- Assist Pt in identifying feelings, triggers to mood dysregulation and maladaptive behaviors, and 2 preventative/coping strategies \par \par \par  [de-identified] : 12/21/23 [de-identified] : \par Encourage to follow-up with psychiatric appts and medication, as well as self-monitoring for changes in symptoms\par \par Encourage to follow-up with psychiatric appts and medication, as well as self-monitoring for changes in symptoms\par  [de-identified] : 12/21/23 [FreeTextEntry5] : Pt struggled with engage in tx and agreed to commit and address contributing factors to past and present periods of decompensation. Pt is declining psychiatric medication and will be encourage to have a follow-up appt.  [de-identified] : Pt will report and demonstrated long-term stabilty and functioning [de-identified] : Not clincially appropriate

## 2023-01-09 ENCOUNTER — APPOINTMENT (OUTPATIENT)
Dept: PSYCHIATRY | Facility: CLINIC | Age: 33
End: 2023-01-09

## 2023-01-13 ENCOUNTER — NON-APPOINTMENT (OUTPATIENT)
Age: 33
End: 2023-01-13

## 2023-01-24 ENCOUNTER — NON-APPOINTMENT (OUTPATIENT)
Age: 33
End: 2023-01-24

## 2023-01-27 NOTE — DISCUSSION/SUMMARY
[FreeTextEntry1] : Pt has not responded to therapist phone outreach, therefore 10 day letter will be mailed today

## 2023-01-31 ENCOUNTER — NON-APPOINTMENT (OUTPATIENT)
Age: 33
End: 2023-01-31

## 2023-01-31 DIAGNOSIS — F33.1 MAJOR DEPRESSIVE DISORDER, RECURRENT, MODERATE: ICD-10-CM

## 2023-01-31 DIAGNOSIS — F60.9 PERSONALITY DISORDER, UNSPECIFIED: ICD-10-CM

## 2023-01-31 DIAGNOSIS — F41.9 ANXIETY DISORDER, UNSPECIFIED: ICD-10-CM

## 2023-02-02 NOTE — HISTORY OF PRESENT ILLNESS
[Suicidal Behavior/Ideation] : suicidal behavior/ideation [Not Applicable] : Not applicable [FreeTextEntry1] : Pt reported long hx of labile mood, and for the past month she began self-medicated with Xanax due to ongoing conflict with her partner. Pt explained she consumed several pills, however did not want to take her life, but wanted "a break" from relationship and various stressors.

## 2023-02-02 NOTE — TREATMENT
[FreeTextEntry8] : Pt reported not taking medication [de-identified] : Pt seen briefly for support surrounding managing stressors, and declined more DBT skills program, which could have been beneficial in managing interpersonal and personality difficulties. [de-identified] : [facility]  [de-identified] : Pt did not report nor indicate ongoing thoughts to harm herself, which initially precipitated tx  [de-identified] : Pt is a 33yo,  female referred from [facility] after 1 hospitalization for SA. Pt reported long hx of labile mood, and for the past month she began self-medicated with Xanax due to ongoing conflict with her partner. Pt explained she consumed several pills, however did not want to take her life, but wanted "a break" from relationship and various stressors. Pt denied substance usage other than recent period and admitted to hx of depression since childhood, as at age [age] she attempted to overdose on the psychiatric and ADHD medication of her mother and brother. Pt explained she feels anger and impulsivity has been a consistent issue as well as "high and lows" throughout her life. Pt is oldest of 3 children born to intact and tumultuous marriage. Pt parents  when she was 16 due to her father living a "double life with a good male friend of the family". Pt stated her father, who is now  "took advantage of" her mother, who suffers from schizophrenia, and lives in [place] with maternal Uncle. Pt's sister has Bipolar disorder and brother, who resides with her, has some depression/ADHD. Pt relocated several times throughout her younger years and was able to get her GED once relocated back to [facility] at age 18. Pt had her son at age 21, while partner was in nursing home, and describes a strong bond and care taking role in regards to parenting, due to how she was raised. Pt was alert and oriented X3 did not report nor indicate any thoughts/plans of a harmful nature toward herself or others. At the time of her initial intake was insightful and motivated and stated that [facility] may not provide the extensive therapy she is would like, however agreed to begin services and inquire about alternate providers in the community. Pt was given opportunity to seek our DBT skills and program and failed to show for consultation appt and   was inconsistent with appts with both psychiatrists and therapists throughout her brief tx in [facility].  Pt had declined medication and psychiatric care and only presented intake with psychiatrist\par

## 2023-02-02 NOTE — REASON FOR VISIT
[Poor engagement (multiple  sessions missed)] : Poor engagement (multiple sessions missed) [FreeTextEntry5] : English [FreeTextEntry9] : 10/24/22 [FreeTextEntry8] : 1/31/23 [FreeTextEntry1] : Pt has hx of non-compliance with tx both with [facility] and prior providers.  [FreeTextEntry2] : Pt referred from [facility] for long hx of labile mood, self-medicating and for SI , as result of psychosocial stressors

## 2023-02-02 NOTE — DISCUSSION/SUMMARY
[FreeTextEntry1] : Pt is a 33yo,  female referred from [facility] after 1 hospitalization for SA. Pt reported long hx of labile mood, and for the past month she began self-medicated with Xanax due to ongoing conflict with her partner. Pt explained she consumed several pills, however did not want to take her life, but wanted "a break" from relationship and various stressors. Pt denied substance usage other than recent period and admitted to hx of depression since childhood, as at age [age] she attempted to overdose on the psychiatric and ADHD medication of her mother and brother. Pt explained she feels anger and impulsivity has been a consistent issue as well as "high and lows" throughout her life. Pt is oldest of 3 children born to intact and tumultuous marriage. Pt parents  when she was 16 due to her father living a "double life with a good male friend of the family". Pt stated her father, who is now  "took advantage of" her mother, who suffers from schizophrenia, and lives in [place] with maternal Uncle. Pt's sister has Bipolar disorder and brother, who resides with her, has some depression/ADHD. Pt relocated several times throughout her younger years and was able to get her GED once relocated back to [facility] at age 18. Pt had her son at age 21, while partner was in care home, and describes a strong bond and care taking role in regards to parenting, due to how she was raised. Pt was alert and oriented X3 did not report nor indicate any thoughts/plans of a harmful nature toward herself or others. At the time of her initial intake was insightful and motivated and stated that [facility] may not provide the extensive therapy she is would like, however agreed to begin services and inquire about alternate providers in the community. Pt was given opportunity to seek our DBT skills and program and failed to show for consultation appt and   was inconsistent with appts with both psychiatrists and therapists throughout her brief tx in [facility].  Pt had declined medication and psychiatric care and only presented intake with psychiatrist\par

## 2023-02-02 NOTE — TREATMENT
[FreeTextEntry8] : Pt reported not taking medication [de-identified] : Pt seen briefly for support surrounding managing stressors, and declined more DBT skills program, which could have been beneficial in managing interpersonal and personality difficulties. [de-identified] : [facility]  [de-identified] : Pt did not report nor indicate ongoing thoughts to harm herself, which initially precipitated tx  [de-identified] : Pt is a 33yo,  female referred from [facility] after 1 hospitalization for SA. Pt reported long hx of labile mood, and for the past month she began self-medicated with Xanax due to ongoing conflict with her partner. Pt explained she consumed several pills, however did not want to take her life, but wanted "a break" from relationship and various stressors. Pt denied substance usage other than recent period and admitted to hx of depression since childhood, as at age [age] she attempted to overdose on the psychiatric and ADHD medication of her mother and brother. Pt explained she feels anger and impulsivity has been a consistent issue as well as "high and lows" throughout her life. Pt is oldest of 3 children born to intact and tumultuous marriage. Pt parents  when she was 16 due to her father living a "double life with a good male friend of the family". Pt stated her father, who is now  "took advantage of" her mother, who suffers from schizophrenia, and lives in [place] with maternal Uncle. Pt's sister has Bipolar disorder and brother, who resides with her, has some depression/ADHD. Pt relocated several times throughout her younger years and was able to get her GED once relocated back to [facility] at age 18. Pt had her son at age 21, while partner was in intermediate, and describes a strong bond and care taking role in regards to parenting, due to how she was raised. Pt was alert and oriented X3 did not report nor indicate any thoughts/plans of a harmful nature toward herself or others. At the time of her initial intake was insightful and motivated and stated that [facility] may not provide the extensive therapy she is would like, however agreed to begin services and inquire about alternate providers in the community. Pt was given opportunity to seek our DBT skills and program and failed to show for consultation appt and   was inconsistent with appts with both psychiatrists and therapists throughout her brief tx in [facility].  Pt had declined medication and psychiatric care and only presented intake with psychiatrist\par

## 2023-02-02 NOTE — BEHAVIORAL HEALTH
[FreeTextEntry2] : Pt is a 31yo,  female referred from [facility] after 1 hospitalization for SA. Pt reported long hx of labile mood, and for the past month she began self-medicated with Xanax due to ongoing conflict with her partner. Pt explained she consumed several pills, however did not want to take her life, but wanted "a break" from relationship and various stressors. Pt denied substance usage other than recent period and admitted to hx of depression since childhood, as at age [age] she attempted to overdose on the psychiatric and ADHD medication of her mother and brother. Pt explained she feels anger and impulsivity has been a consistent issue as well as "high and lows" throughout her life. Pt is oldest of 3 children born to intact and tumultuous marriage. Pt parents  when she was 16 due to her father living a "double life with a good male friend of the family". Pt stated her father, who is now  "took advantage of" her mother, who suffers from schizophrenia, and lives in [place] with maternal Uncle. Pt's sister has Bipolar disorder and brother, who resides with her, has some depression/ADHD. Pt relocated several times throughout her younger years and was able to get her GED once relocated back to [facility] at age 18. Pt had her son at age 21, while partner was in [facility], and describes a strong bond and care taking role in regards to parenting, due to how she was raised. Pt was alert and oriented X3 did not report nor indicate any thoughts/plans of a harmful nature toward herself or others. At the time of her initial intake was insightful and motivated and stated that [facility] may not provide the extensive therapy she is would like, however agreed to begin services and inquire about alternate providers in the community. Pt was given opportunity to seek our DBT skills and program and failed to show for consultation appt and   was inconsistent with appts with both psychiatrists and therapists throughout her brief tx in [facility].  Pt had declined medication and psychiatric care and only presented intake with psychiatrist\par

## 2023-02-02 NOTE — DISCUSSION/SUMMARY
[FreeTextEntry1] : Pt is a 33yo,  female referred from [facility] after 1 hospitalization for SA. Pt reported long hx of labile mood, and for the past month she began self-medicated with Xanax due to ongoing conflict with her partner. Pt explained she consumed several pills, however did not want to take her life, but wanted "a break" from relationship and various stressors. Pt denied substance usage other than recent period and admitted to hx of depression since childhood, as at age [age] she attempted to overdose on the psychiatric and ADHD medication of her mother and brother. Pt explained she feels anger and impulsivity has been a consistent issue as well as "high and lows" throughout her life. Pt is oldest of 3 children born to intact and tumultuous marriage. Pt parents  when she was 16 due to her father living a "double life with a good male friend of the family". Pt stated her father, who is now  "took advantage of" her mother, who suffers from schizophrenia, and lives in [place] with maternal Uncle. Pt's sister has Bipolar disorder and brother, who resides with her, has some depression/ADHD. Pt relocated several times throughout her younger years and was able to get her GED once relocated back to [facility] at age 18. Pt had her son at age 21, while partner was in nursing home, and describes a strong bond and care taking role in regards to parenting, due to how she was raised. Pt was alert and oriented X3 did not report nor indicate any thoughts/plans of a harmful nature toward herself or others. At the time of her initial intake was insightful and motivated and stated that [facility] may not provide the extensive therapy she is would like, however agreed to begin services and inquire about alternate providers in the community. Pt was given opportunity to seek our DBT skills and program and failed to show for consultation appt and   was inconsistent with appts with both psychiatrists and therapists throughout her brief tx in [facility].  Pt had declined medication and psychiatric care and only presented intake with psychiatrist\par

## 2023-02-02 NOTE — BEHAVIORAL HEALTH
[FreeTextEntry2] : Pt is a 31yo,  female referred from [facility] after 1 hospitalization for SA. Pt reported long hx of labile mood, and for the past month she began self-medicated with Xanax due to ongoing conflict with her partner. Pt explained she consumed several pills, however did not want to take her life, but wanted "a break" from relationship and various stressors. Pt denied substance usage other than recent period and admitted to hx of depression since childhood, as at age [age] she attempted to overdose on the psychiatric and ADHD medication of her mother and brother. Pt explained she feels anger and impulsivity has been a consistent issue as well as "high and lows" throughout her life. Pt is oldest of 3 children born to intact and tumultuous marriage. Pt parents  when she was 16 due to her father living a "double life with a good male friend of the family". Pt stated her father, who is now  "took advantage of" her mother, who suffers from schizophrenia, and lives in [place] with maternal Uncle. Pt's sister has Bipolar disorder and brother, who resides with her, has some depression/ADHD. Pt relocated several times throughout her younger years and was able to get her GED once relocated back to [facility] at age 18. Pt had her son at age 21, while partner was in correction, and describes a strong bond and care taking role in regards to parenting, due to how she was raised. Pt was alert and oriented X3 did not report nor indicate any thoughts/plans of a harmful nature toward herself or others. At the time of her initial intake was insightful and motivated and stated that [facility] may not provide the extensive therapy she is would like, however agreed to begin services and inquire about alternate providers in the community. Pt was given opportunity to seek our DBT skills and program and failed to show for consultation appt and   was inconsistent with appts with both psychiatrists and therapists throughout her brief tx in [facility].  Pt had declined medication and psychiatric care and only presented intake with psychiatrist\par

## 2023-05-03 NOTE — BH CONSULTATION LIAISON PROGRESS NOTE - NSBHMSEGROOM_PSY_A_CORE
Fair
Fair
Doxycycline Pregnancy And Lactation Text: This medication is Pregnancy Category D and not consider safe during pregnancy. It is also excreted in breast milk but is considered safe for shorter treatment courses.

## 2023-11-15 NOTE — PATIENT PROFILE BEHAVIORAL HEALTH - INTERNATIONAL TRAVEL
Quality 431: Preventive Care And Screening: Unhealthy Alcohol Use - Screening: Patient not identified as an unhealthy alcohol user when screened for unhealthy alcohol use using a systematic screening method Quality 226: Preventive Care And Screening: Tobacco Use: Screening And Cessation Intervention: Patient screened for tobacco use and is an ex/non-smoker Detail Level: Detailed No

## 2024-11-29 NOTE — PROGRESS NOTE BEHAVIORAL HEALTH - NSBHADMITIPREASON_PSY_A_CORE
99
Danger to self; mental illness expected to respond to inpatient care
Danger to self; mental illness expected to respond to inpatient care

## 2025-06-24 NOTE — FAMILY HISTORY
Pre-Operative:  1.  Patient/Caregiver identifies - states name and date of birth.  2.  The patient is free from signs and symptoms of injury.  3.  The patient receives appropriate medication(s), safely administered during the Perioperative period.  4.  The patient is free from signs and symptoms of infection.  5.  The patient has wound / tissue perfusion.  6.  The patients's fluid, electrolyte, and acid-base balances are established preoperatively.  7.  The patient's pulmonary function is established preoperatively.  8.  The patient's cardiovascular status is established preoperatively.  9.  The patient / caregiver demonstrates knowledge of nutritional management related to the operative or other invasive procedure.  10.  The patient/caregiver demonstrates knowledge of medication management.  11.  The patient/caregiver demonstrates knowledge of pain management.  12.  The patient participates in the rehabilitation process as applicable.  13.  The patient/caregiver participates in decisions affection his or her Perioperative plan of care.  14.  The patient's care is consistent with the individualized Perioperative plan of care.  15.  The patient's right to privacy is maintained.  16.  The patient is the recipient of competent and ethical care within legal standards of practice.  17.  The patient's value system, lifestyle, ethnicity, and culture are considered, respected, and incorporated in the Perioperative plan of care and understands special services available.  18.  The patient demonstrates and/or reports adequate pain control throughout the the Perioperative period.  19.  The patient's neurological status is established preoperatively.  20.  The patient/caregiver demonstrates knowledge of the expected responses to the operative or invasive procedure.  21.  Patient/Caregiver has reduced anxiety.  Interventions- Familiarize with environment and equipment.  22. Patient/Caregiver verbalizes understanding of Phase I  [FreeTextEntry1] : Pt is oldest of 3 children born to intact and tumultuous marriage. Pt parents  when she was 16 due to her father living a "double life with a good male friend of the family". Pt stated her father, who is now  "took advantage of" her mother, who suffers from schizophrenia, and lives in [place] with maternal Uncle. Pt's sister is dx with Bipolar disorder and brother, who resides with her, has some depression